# Patient Record
Sex: MALE | Race: WHITE | Employment: FULL TIME | URBAN - METROPOLITAN AREA
[De-identification: names, ages, dates, MRNs, and addresses within clinical notes are randomized per-mention and may not be internally consistent; named-entity substitution may affect disease eponyms.]

---

## 2022-01-28 ENCOUNTER — TRANSCRIBE ORDER (OUTPATIENT)
Dept: SCHEDULING | Age: 52
End: 2022-01-28

## 2022-01-28 DIAGNOSIS — M19.012 ARTHRITIS OF LEFT SHOULDER REGION: Primary | ICD-10-CM

## 2022-02-09 ENCOUNTER — HOSPITAL ENCOUNTER (OUTPATIENT)
Dept: CT IMAGING | Age: 52
Discharge: HOME | End: 2022-02-09
Attending: ORTHOPAEDIC SURGERY
Payer: OTHER GOVERNMENT

## 2022-02-09 DIAGNOSIS — M19.012 ARTHRITIS OF LEFT SHOULDER REGION: ICD-10-CM

## 2022-02-09 PROCEDURE — 73200 CT UPPER EXTREMITY W/O DYE: CPT

## 2022-02-24 NOTE — PERIOP NOTES
PER PAT SCHEDULING NOTES:    PAT: 3-3 @ 9:30/PT TO GET PCR COVID TEST AT [facility] IN [facility] ON 3-7/RESULTS TO BE FAXED, GAVE FAX NUMBER TO PT/ INSTRUCTED PT TO TAKE COPY OF RESULTS TO HOSP DAY OF SX

## 2022-03-03 ENCOUNTER — HOSPITAL ENCOUNTER (OUTPATIENT)
Dept: PREADMISSION TESTING | Age: 52
Discharge: HOME | End: 2022-03-03
Payer: OTHER GOVERNMENT

## 2022-03-03 VITALS
HEART RATE: 60 BPM | SYSTOLIC BLOOD PRESSURE: 130 MMHG | TEMPERATURE: 98.3 F | HEIGHT: 73 IN | BODY MASS INDEX: 30.62 KG/M2 | DIASTOLIC BLOOD PRESSURE: 84 MMHG | WEIGHT: 231.04 LBS

## 2022-03-03 LAB
ABO + RH BLD: NORMAL
APPEARANCE UR: CLEAR
BACTERIA URNS QL MICRO: NEGATIVE /HPF
BILIRUB UR QL: NEGATIVE
BLOOD GROUP ANTIBODIES SERPL: NORMAL
COLOR UR: NORMAL
EPITH CASTS URNS QL MICRO: NORMAL /LPF
EST. AVERAGE GLUCOSE BLD GHB EST-MCNC: 105 MG/DL
GLUCOSE UR STRIP.AUTO-MCNC: NEGATIVE MG/DL
HBA1C MFR BLD: 5.3 % (ref 4–5.6)
HGB UR QL STRIP: NEGATIVE
HYALINE CASTS URNS QL MICRO: NORMAL /LPF (ref 0–5)
INR PPP: 0.9 (ref 0.9–1.1)
KETONES UR QL STRIP.AUTO: NEGATIVE MG/DL
LEUKOCYTE ESTERASE UR QL STRIP.AUTO: NEGATIVE
NITRITE UR QL STRIP.AUTO: NEGATIVE
PH UR STRIP: 7 [PH] (ref 5–8)
PROT UR STRIP-MCNC: NEGATIVE MG/DL
PROTHROMBIN TIME: 9.9 SEC (ref 9–11.1)
RBC #/AREA URNS HPF: NORMAL /HPF (ref 0–5)
SP GR UR REFRACTOMETRY: 1.01 (ref 1–1.03)
SPECIMEN EXP DATE BLD: NORMAL
UA: UC IF INDICATED,UAUC: NORMAL
UROBILINOGEN UR QL STRIP.AUTO: 0.2 EU/DL (ref 0.2–1)
WBC URNS QL MICRO: NORMAL /HPF (ref 0–4)

## 2022-03-03 PROCEDURE — 86900 BLOOD TYPING SEROLOGIC ABO: CPT

## 2022-03-03 PROCEDURE — 85610 PROTHROMBIN TIME: CPT

## 2022-03-03 PROCEDURE — 83036 HEMOGLOBIN GLYCOSYLATED A1C: CPT

## 2022-03-03 PROCEDURE — 81001 URINALYSIS AUTO W/SCOPE: CPT

## 2022-03-03 RX ORDER — LEVOTHYROXINE SODIUM 200 UG/1
TABLET ORAL
COMMUNITY

## 2022-03-03 RX ORDER — INDOMETHACIN 50 MG/1
75 CAPSULE ORAL 3 TIMES DAILY
COMMUNITY

## 2022-03-03 RX ORDER — COLCHICINE 0.6 MG/1
0.6 TABLET ORAL 3 TIMES DAILY
COMMUNITY

## 2022-03-03 RX ORDER — BISMUTH SUBSALICYLATE 262 MG
1 TABLET,CHEWABLE ORAL DAILY
COMMUNITY

## 2022-03-03 RX ORDER — CHOLECALCIFEROL (VITAMIN D3) 50 MCG
CAPSULE ORAL DAILY
COMMUNITY

## 2022-03-03 RX ORDER — CHOLECALCIFEROL TAB 125 MCG (5000 UNIT) 125 MCG
TAB ORAL DAILY
COMMUNITY

## 2022-03-03 RX ORDER — ATORVASTATIN CALCIUM 20 MG/1
TABLET, FILM COATED ORAL
COMMUNITY

## 2022-03-03 RX ORDER — LOSARTAN POTASSIUM 25 MG/1
TABLET ORAL
COMMUNITY

## 2022-03-03 RX ORDER — PROPRANOLOL HYDROCHLORIDE 60 MG/1
CAPSULE, EXTENDED RELEASE ORAL
COMMUNITY

## 2022-03-03 NOTE — PERIOP NOTES
[address] INSTRUCTIONS  ORTHOPAEDIC    Surgery Date:   3/10/22    [facility] staff will call you between 4 PM- 8 PM the day before surgery with your arrival time. If your surgery is on a Monday, we will call you the preceding Friday. Please call [phone] after 8 PM if you did not receive your arrival time. 1. Please report to [facility] Patient Access/Admitting on the 1st floor. Bring your insurance card, photo identification, and any copayment (if applicable). 2. If you are going home the same day of your surgery, you must have a responsible adult to drive you home. You need to have a responsible adult to stay with you the first 24 hours after surgery and you should not drive a car for 24 hours following your surgery. 3. Do NOT eat any solid foods after midnight the night before surgery including candy, mints or gum. You may drink clear liquids from midnight until 1 hour prior to arrival time. You may drink up to 12 ounces at one time every 4 hours. 4. Do NOT drink alcohol or smoke 24 hours before surgery. STOP smoking for 14 days prior as it helps with breathing and healing after surgery. 5. If your arrival time is 3pm or later, you may eat a light breakfast before 8am (toast, bagel-no butter, black coffee, plain tea, fruit juice-no pulp) Please note special instructions, if applicable, below for medications. 6. If you are being admitted to the hospital,please leave personal belongings/luggage in your car until you have an assigned hospital room number. 7. Please wear comfortable clothes. Wear your glasses instead of contacts. We ask that all money, jewelry and valuables be left at home. Wear no make up, particularly mascara, the day of surgery. 8.  All body piercings, rings, and jewelry need to be removed and left at home. Please remove any nail polish or artificial nails from your fingernails. Please wear your hair loose or down.  Please no pony-tails, buns, or any metal hair accessories. If you shower the morning of surgery, please do not apply any lotions or powders afterwards. You may wear deodorant. Do not shave any body area within 24 hours of your surgery. 9. Please follow all instructions to avoid any potential surgical cancellation. 10. Should your physical condition change, (i.e. fever, cold, flu, etc.) please notify your surgeon as soon as possible. 11. It is important to be on time. If a situation occurs where you may be delayed, please call:  [phone] / [phone] on the day of surgery. 12. The Preadmission Testing staff can be reached at [phone]. 13. Special instructions: NONE    Current Outpatient Medications   Medication Sig    levothyroxine (SYNTHROID) 200 mcg tablet Take  by mouth Daily (before breakfast).  atorvastatin (LIPITOR) 20 mg tablet Take  by mouth nightly.  propranolol LA (INDERAL LA) 60 mg SR capsule Take  by mouth nightly.  losartan (COZAAR) 25 mg tablet Take  by mouth nightly.  colchicine 0.6 mg tablet Take 0.6 mg by mouth three (3) times daily.  indomethacin (INDOCIN) 50 mg capsule Take 75 mg by mouth three (3) times daily.  multivitamin (ONE A DAY) tablet Take 1 Tablet by mouth daily.  omega 3-dha-epa-fish oil (Fish OiL) 100-160-1,000 mg cap Take  by mouth daily.  cholecalciferol (VITAMIN D3) (5000 Units/125 mcg) tab tablet Take  by mouth daily. No current facility-administered medications for this encounter. 1. YOU MUST ONLY TAKE THESE MEDICATIONS THE MORNING OF SURGERY WITH A SIP OF WATER: LEVOTHYROXINE, COLCHICINE  2. MEDICATIONS TO TAKE THE MORNING OF SURGERY ONLY IF NEEDED: NONE  3. HOLD these prescription medications BEFORE Surgery: NONE  4. Ask your surgeon/prescribing physician about when/if to STOP taking these medications: NONE  5. Stop any non-steroidal anti-inflammatory drugs (i.e. Ibuprofen, Naproxen, Advil, Aleve) 3 days before surgery. You may take Tylenol.   STOP all vitamins and herbal supplements 1 week prior to  Surgery. 6. STOP INDOMETHACIN ON 3/7/22  7. STOP MULTIVITAMIN, FISH OIL, VITAMIN D ON 3/3/22  8. If you are currently taking Plavix, Coumadin, or any other blood-thinning/anticoagulant medication contact your prescribing physician for instructions. Preventing Infections Before and After  Your Surgery    IMPORTANT INSTRUCTIONS    You play an important role in your health and preparation for surgery. To reduce the germs on your skin you will need to shower with CHG soap (Chorhexidine gluconate 4%) two times before surgery. CHG soap (Hibiclens, Hex-A-Clens or store brand)   CHG soap will be provided at your Preadmission Testing (PAT) appointment.  If you do not have a PAT appointment before surgery, you may arrange to  CHG soap from our office or purchase CHG soap at a pharmacy, grocery or department store.  You need to purchase TWO 4 ounce bottles to use for your 2 showers. Steps to follow:  1. Wash your hair with your normal shampoo and your body with regular soap and rinse well to remove shampoo and soap from your skin. 2. Wet a clean washcloth and turn off the shower. 3. Put CHG soap on washcloth and apply to your entire body from the neck down. Do not use on your head, face or private parts(genitals). Do not use CHG soap on open sores, wounds or areas of skin irritation. 4. Wash you body gently for 5 minutes. Do not wash your skin too hard. This soap does not create lather. Pay special attention to your underarms and from your belly button to your feet. 5. Turn the shower back on and rinse well to get CHG soap off your body. 6. Pat your skin dry with a clean, dry towel. Do not apply lotions or moisturizer. 7. Put on clean clothes and sleep on fresh bed sheets and do not allow pets to sleep with you.     Shower with CHG soap 2 times before your surgery   The evening before your surgery   The morning of your surgery      Tips to help prevent infections after your surgery:  1. Protect your surgical wound from germs:  ? Hand washing is the most important thing you and your caregivers can do to prevent infections. ? Keep your bandage clean and dry! ? Do not touch your surgical wound. 2. Use clean, freshly washed towels and washcloths every time you shower; do not share bath linens with others. 3. Until your surgical wound is healed, wear clothing and sleep on bed linens each day that are clean and freshly washed. 4. Do not allow pets to sleep in your bed with you or touch your surgical wound. 5. Do not smoke  smoking delays wound healing. This may be a good time to stop smoking. 6. If you have diabetes, it is important for you to manage your blood sugar levels properly before your surgery as well as after your surgery. Poorly managed blood sugar levels slow down wound healing and prevent you from healing completely. Prevention of Infection  Testing for Staphylococcus aureus on your skin before surgery    Staphylococcus aureus (staph) is a common bacteria that is found on the body. It normally does not cause infection on healthy skin. Before surgery, you will be tested to see if you have staph by swabbing the inside of your nose. When you have an incision with surgery, the goal is to protect that incision from infection. Removal of the staph bacteria before surgery can decrease the risk of a surgical site infection. If your nose swab is positive for staph you will be called. Your treatment will include 2 steps:   Prescription for Mupirocin ointment to be used in each nostril twice a day for 5 days.  Showering with Chlorhexidine (CHG) liquid soap for 5 days prior to surgery. How to use Mupirocin ointment in your nose  1.  the prescription from your pharmacy. You will receive a large tube of ointment which will be big enough for all of your treatments. You will apply this ointment to each nostril 2 times a day for 5 days.   2. Wash your hands with  gel or soap and water for 20 seconds before using ointment. 3. Place a pea-sized amount of ointment on a cotton Q-tip. 4. Apply ointment just inside of each nostril with the Q-tip. Do not push Q-tip or ointment deep inside you nose. 5. Press your nostrils together and massage for a few seconds. 6. Wash your hands with  gel or soap and water after you are finished. 7. Do not get ointment near your eyes. If it gets into your eyes, rinse them with cool water. 8. If you need to use nasal spray, clean the tip of the bottle with alcohol before use and do not use both at the same time. 9. If you are scheduled for COVID testing during the 5 days, do NOT apply morning dose until after the COVID test has been performed. How to use Chlorhexidine (CHG) 4% liquid soap  1. Purchase an 8 ounce bottle of CHG liquid soap (Chlorhexidine 4%, Hibiclens, Hex-A-Clens or store brand) at a pharmacy or grocery store. 2. Wash your hair with your normal shampoo and your body with regular soap and rinse well to remove shampoo and soap from your skin. 3. Wet a clean washcloth and turn off the shower. 4. Put CHG soap on washcloth and apply to your entire body from the neck down. Do not use on your head, face or private parts(genitals). Do not use CHG soap on open sores, wounds or areas of skin irritation. 5. Wash your body gently for 5 minutes. Do not wash your skin too hard. This soap does not create lather. Pay special attention to your underarms and from your belly button to your feet. 6. Turn the shower back on and rinse well to get CHG soap off your body. 7. Pat your skin dry with a clean, dry towel. Do not apply lotions or moisturizer. 8. Put on clean clothes and sleep on fresh bed sheets the night before surgery. Do not allow pets to sleep with you. Eating and Drinking Before Surgery     You may eat a regular dinner at the usual time on the day before your surgery.    Do NOT eat any solid foods after midnight unless your arrival time at the hospital is 3pm or later.  You may drink clear liquids only from 12 midnight until 1 hours prior to your arrival time at the hospital on the day of your surgery. Do NOT drink alcohol.  Clear liquids include:  o Water  o Fruit juices without pulp( i.e. apple juice)  o Carbonated beverages  o Black coffee (no cream/milk)  o Tea (no cream/milk)  o Gatorade   You may drink up to 12-16 ounces at one time every 4 hours between the hours of midnight and 1 hour before your arrival time at the hospital. Example- if your arrival time at the hospital is 6am, you may drink 12-16 ounces of clear liquids no later than 5am.   If your arrival time at the hospital is 3pm or later, you may eat a light breakfast before 8am.   A light breakfast includes:  o Toast or bagel (no butter)  o Black coffee (no cream/milk)  o Tea (no cream/milk)  o Fruit juices without pulp ( i.e. apple juice)  o Do NOT eat meat, eggs, vegetables or fruit   If you have any questions, please contact your surgeon's office. [facility]   Instructions for Pre-Surgery COVID-19 Testing     Across our ministry we have established standard guidelines to ensure the health and safety of our patients, residents and associates as we resume elective services for patients. All patients presenting for surgery are required to have a COVID-19 test result within 96 hours of their scheduled surgery. [facility] is providing this test free of charge to the patient.    Instructions for COVID-19 Testing:     Patients will receive a call from Pre-Admission Testing 4-5 days prior to surgery to schedule a date and time to come to the [address] [place] [address] Drive for their COVID-19 test   Patients are advised to self-quarantine after testing until their scheduled surgery   Once on site, patients will be registered and receive COVID test in their vehicle   If a patient is scheduled for normal Pre Admission Testing 96 hours from date of surgery, the patient will still have their COVID test done at the [address] [place] [address] located at [address] [place] [address] Positive results will be shared with the surgeon and anesthesiologist and may result in cancellation of the elective procedure    Testing Hours and Location:   Address:  [address] Admission [address] [facility] in the Discharge Lot on [facility] (Map Attached)  [address], [address]   Hours: Monday- Friday 7a-3p    PAT Phone Number: [phone]            Patient Information Regarding COVID Restrictions    Patients are advised to self-quarantine after COVID testing up to the day of the scheduled procedure. Day of Procedure     Please park in the parking deck or any designated visitor parking lot.  Enter the facility through the Main Entrance of the hospital.   A temperature check and appropriate symptom/exposure screening will be done prior to entry to the facility.  On the day of surgery, please provide the cell phone number of the person who will be waiting for you to the Patient Access representative at the time of registration.  Please wear a mask on the day of your procedure.  We are now allowing one designated visitor per stay. Pediatric patients may have 2 designated visitors. This one person may come in with you on the day of your procedure.  No visitors under the age of 13.  The designated visitor must also wear a mask.  Once your procedure and the immediate recovery period is completed, a nurse in the recovery area will contact your designated visitor to inform them of your room number or to otherwise review other pertinent information regarding your care.  Social distancing practices are to be adhered to in waiting areas and the cafeteria. The patient was contacted in person.    He verbalized understanding of all instructions does not  need reinforcement.

## 2022-03-04 LAB
BACTERIA SPEC CULT: NORMAL
BACTERIA SPEC CULT: NORMAL
SERVICE CMNT-IMP: NORMAL

## 2022-03-04 NOTE — PERIOP NOTES
PAT Nurse Practitioner   Pre-Operative Chart Review/Assessment:-ORTHOPEDIC                Patient Name:  Héctor Rios                                                           Age:   45 y.o.    :  1970     Today's Date:  3/7/2022     Date of PAT:   3/3/2022      Date of Surgery:    3/10/2022      Procedure(s):  Left Total Shoulder Arthroplasty and Open Biceps Tenodesis     Surgeon:   Dr. Nena Ley:      1)  Medical Clearance:  Dr. Bunny Pascal      2)  Cardiac Clearance:  EKG and METs reviewed. No further cardiac evaluation requested. EKG from PCP on 3/1/22 on chart. 3)  Diabetic Treatment Consult:  Not indicated. A1c-5.3      4)  Sleep Apnea evaluation:   +SHRADDHA dx. Pt no longer uses mouth guard. 5) Treatment for MRSA/Staph Aureus:  Neg      6) Additional Concerns:  HTN                Vital Signs:         Vitals:    22 0954   BP: 130/84   Pulse: 60   Temp: 98.3 °F (36.8 °C)   Weight: 104.8 kg (231 lb 0.7 oz)   Height: 6' 1\" (1.854 m)            ____________________________________________  PAST MEDICAL HISTORY  Past Medical History:   Diagnosis Date    Arthritis     Gout attack     Hypertension     Sleep apnea     MOUTH GUARD USED, NOT NOW.  Thyroid disease       ____________________________________________  PAST SURGICAL HISTORY  Past Surgical History:   Procedure Laterality Date    HX COLONOSCOPY      HX HERNIA REPAIR Right 1970    inguinal    HX KNEE ARTHROSCOPY Right 1982    X2    HX ORTHOPAEDIC Right     ACHILLES    HX ORTHOPAEDIC Right 2019    ELBOW    HX SHOULDER REPLACEMENT Right 2020    HX WISDOM TEETH EXTRACTION        ____________________________________________  HOME MEDICATIONS  Current Outpatient Medications   Medication Sig    levothyroxine (SYNTHROID) 200 mcg tablet Take  by mouth Daily (before breakfast).  atorvastatin (LIPITOR) 20 mg tablet Take  by mouth nightly.     propranolol LA (INDERAL LA) 60 mg SR capsule Take by mouth nightly.  losartan (COZAAR) 25 mg tablet Take  by mouth nightly.  colchicine 0.6 mg tablet Take 0.6 mg by mouth three (3) times daily.  indomethacin (INDOCIN) 50 mg capsule Take 75 mg by mouth three (3) times daily.  multivitamin (ONE A DAY) tablet Take 1 Tablet by mouth daily.  omega 3-dha-epa-fish oil (Fish OiL) 100-160-1,000 mg cap Take  by mouth daily.  cholecalciferol (VITAMIN D3) (5000 Units/125 mcg) tab tablet Take  by mouth daily. No current facility-administered medications for this encounter.      ____________________________________________  ALLERGIES  Not on File   ____________________________________________  SOCIAL HISTORY  Social History     Tobacco Use    Smoking status: Never Smoker    Smokeless tobacco: Former User   Substance Use Topics    Alcohol use: Yes     Comment: [facility]      ____________________________________________   Internal Administration   First Dose COVID-19, Pfizer Purple top, DILUTE for use, 12+ yrs, 30mcg/0.3mL dose  03/10/2021   Second Dose COVID-19, Pfizer Purple top, DILUTE for use, 12+ yrs, 30mcg/0.3mL dose  03/31/2021      Last COVID Lab Pt to have test done locally and bring DOS. Labs:     Hospital Outpatient Visit on 03/03/2022   Component Date Value Ref Range Status    Crossmatch Expiration 03/03/2022 03/13/2022,2359   Final    ABO/Rh(D) 03/03/2022 B POSITIVE   Final    Antibody screen 03/03/2022 NEG   Final    INR 03/03/2022 0.9  0.9 - 1.1   Final    A single therapeutic range for Vit K antagonists may not be optimal for all indications - see June, 2008 issue of Chest, American College of Chest Physicians Evidence-Based Clinical Practice Guidelines, 8th Edition.     Prothrombin time 03/03/2022 9.9  9.0 - 11.1 sec Final    Color 03/03/2022 YELLOW/STRAW    Final    Color Reference Range: Straw, Yellow or Dark Yellow    Appearance 03/03/2022 CLEAR  CLEAR   Final    Specific gravity 03/03/2022 1.008  1.003 - 1.030   Final    pH (UA) 03/03/2022 7.0  5.0 - 8.0   Final    Protein 03/03/2022 Negative  NEG mg/dL Final    Glucose 03/03/2022 Negative  NEG mg/dL Final    Ketone 03/03/2022 Negative  NEG mg/dL Final    Bilirubin 03/03/2022 Negative  NEG   Final    Blood 03/03/2022 Negative  NEG   Final    Urobilinogen 03/03/2022 0.2  0.2 - 1.0 EU/dL Final    Nitrites 03/03/2022 Negative  NEG   Final    Leukocyte Esterase 03/03/2022 Negative  NEG   Final    UA:UC IF INDICATED 03/03/2022 CULTURE NOT INDICATED BY UA RESULT  CNI   Final    WBC 03/03/2022 0-4  0 - 4 /hpf Final    RBC 03/03/2022 0-5  0 - 5 /hpf Final    Epithelial cells 03/03/2022 FEW  FEW /lpf Final    Epithelial cell category consists of squamous cells and /or transitional urothelial cells. Renal tubular cells, if present, are separately identified as such.  Bacteria 03/03/2022 Negative  NEG /hpf Final    Hyaline cast 03/03/2022 0-2  0 - 5 /lpf Final    Hemoglobin A1c 03/03/2022 5.3  4.0 - 5.6 % Final    Comment: NEW METHOD  PLEASE NOTE NEW REFERENCE RANGE  (NOTE)  HbA1C Interpretive Ranges  <5.7              Normal  5.7 - 6.4         Consider Prediabetes  >6.5              Consider Diabetes      Est. average glucose 03/03/2022 105  mg/dL Final    Special Requests: 03/03/2022 NO SPECIAL REQUESTS    Final    Culture result: 03/03/2022 MRSA NOT PRESENT    Final       Skin:     Denies open wounds, cuts, sores, rashes or other areas of concern in PAT assessment.           Fredi Valdes NP

## 2022-03-10 ENCOUNTER — HOSPITAL ENCOUNTER (OUTPATIENT)
Age: 52
Setting detail: OBSERVATION
Discharge: HOME | End: 2022-03-11
Attending: ORTHOPAEDIC SURGERY | Admitting: ORTHOPAEDIC SURGERY
Payer: OTHER GOVERNMENT

## 2022-03-10 ENCOUNTER — ANESTHESIA EVENT (OUTPATIENT)
Dept: SURGERY | Age: 52
End: 2022-03-10
Payer: OTHER GOVERNMENT

## 2022-03-10 ENCOUNTER — APPOINTMENT (OUTPATIENT)
Dept: GENERAL RADIOLOGY | Age: 52
End: 2022-03-10
Attending: ORTHOPAEDIC SURGERY
Payer: OTHER GOVERNMENT

## 2022-03-10 ENCOUNTER — ANESTHESIA (OUTPATIENT)
Dept: SURGERY | Age: 52
End: 2022-03-10
Payer: OTHER GOVERNMENT

## 2022-03-10 DIAGNOSIS — M19.012 PRIMARY OSTEOARTHRITIS OF LEFT SHOULDER: Primary | ICD-10-CM

## 2022-03-10 DIAGNOSIS — Z96.612 STATUS POST TOTAL SHOULDER ARTHROPLASTY, LEFT: ICD-10-CM

## 2022-03-10 LAB
GLUCOSE BLD STRIP.AUTO-MCNC: 106 MG/DL (ref 65–117)
SERVICE CMNT-IMP: NORMAL

## 2022-03-10 PROCEDURE — 77030011264 HC ELECTRD BLD EXT COVD -A: Performed by: ORTHOPAEDIC SURGERY

## 2022-03-10 PROCEDURE — 74011250636 HC RX REV CODE- 250/636: Performed by: ORTHOPAEDIC SURGERY

## 2022-03-10 PROCEDURE — 74011250637 HC RX REV CODE- 250/637: Performed by: ORTHOPAEDIC SURGERY

## 2022-03-10 PROCEDURE — G0378 HOSPITAL OBSERVATION PER HR: HCPCS

## 2022-03-10 PROCEDURE — 77030010507 HC ADH SKN DERMBND J&J -B: Performed by: ORTHOPAEDIC SURGERY

## 2022-03-10 PROCEDURE — 74011250636 HC RX REV CODE- 250/636: Performed by: ANESTHESIOLOGY

## 2022-03-10 PROCEDURE — 74011000250 HC RX REV CODE- 250: Performed by: ORTHOPAEDIC SURGERY

## 2022-03-10 PROCEDURE — 2709999900 HC NON-CHARGEABLE SUPPLY: Performed by: ORTHOPAEDIC SURGERY

## 2022-03-10 PROCEDURE — 77030006822 HC BLD SAW SAG BRSM -B: Performed by: ORTHOPAEDIC SURGERY

## 2022-03-10 PROCEDURE — 77030002922 HC SUT FBRWRE ARTH -B: Performed by: ORTHOPAEDIC SURGERY

## 2022-03-10 PROCEDURE — 77030002933 HC SUT MCRYL J&J -A: Performed by: ORTHOPAEDIC SURGERY

## 2022-03-10 PROCEDURE — 77030026438 HC STYL ET INTUB CARD -A: Performed by: ANESTHESIOLOGY

## 2022-03-10 PROCEDURE — 74011250636 HC RX REV CODE- 250/636: Performed by: NURSE ANESTHETIST, CERTIFIED REGISTERED

## 2022-03-10 PROCEDURE — 77030040361 HC SLV COMPR DVT MDII -B: Performed by: ORTHOPAEDIC SURGERY

## 2022-03-10 PROCEDURE — 77030018791 HC NDL SUT CARM -A: Performed by: ORTHOPAEDIC SURGERY

## 2022-03-10 PROCEDURE — 74011250637 HC RX REV CODE- 250/637

## 2022-03-10 PROCEDURE — 77030040922 HC BLNKT HYPOTHRM STRY -A

## 2022-03-10 PROCEDURE — 74011250637 HC RX REV CODE- 250/637: Performed by: ANESTHESIOLOGY

## 2022-03-10 PROCEDURE — 77030019908 HC STETH ESOPH SIMS -A: Performed by: ANESTHESIOLOGY

## 2022-03-10 PROCEDURE — 77030018547 HC SUT ETHBND1 J&J -B: Performed by: ORTHOPAEDIC SURGERY

## 2022-03-10 PROCEDURE — 76210000016 HC OR PH I REC 1 TO 1.5 HR: Performed by: ORTHOPAEDIC SURGERY

## 2022-03-10 PROCEDURE — 74011000258 HC RX REV CODE- 258: Performed by: NURSE ANESTHETIST, CERTIFIED REGISTERED

## 2022-03-10 PROCEDURE — 77030008684 HC TU ET CUF COVD -B: Performed by: ANESTHESIOLOGY

## 2022-03-10 PROCEDURE — 77030013079 HC BLNKT BAIR HGGR 3M -A: Performed by: ANESTHESIOLOGY

## 2022-03-10 PROCEDURE — C1713 ANCHOR/SCREW BN/BN,TIS/BN: HCPCS | Performed by: ORTHOPAEDIC SURGERY

## 2022-03-10 PROCEDURE — 77030020274 HC MISC IMPL ORTHOPEDIC: Performed by: ORTHOPAEDIC SURGERY

## 2022-03-10 PROCEDURE — 76010000173 HC OR TIME 3 TO 3.5 HR INTENSV-TIER 1: Performed by: ORTHOPAEDIC SURGERY

## 2022-03-10 PROCEDURE — 77030020275 HC MISC ORTHOPEDIC: Performed by: ORTHOPAEDIC SURGERY

## 2022-03-10 PROCEDURE — 74011000250 HC RX REV CODE- 250: Performed by: NURSE ANESTHETIST, CERTIFIED REGISTERED

## 2022-03-10 PROCEDURE — 77030031139 HC SUT VCRL2 J&J -A: Performed by: ORTHOPAEDIC SURGERY

## 2022-03-10 PROCEDURE — 73020 X-RAY EXAM OF SHOULDER: CPT

## 2022-03-10 PROCEDURE — 82962 GLUCOSE BLOOD TEST: CPT

## 2022-03-10 PROCEDURE — C1776 JOINT DEVICE (IMPLANTABLE): HCPCS | Performed by: ORTHOPAEDIC SURGERY

## 2022-03-10 PROCEDURE — 77030018074 HC RTVR SUT ARTH4 S&N -B: Performed by: ORTHOPAEDIC SURGERY

## 2022-03-10 PROCEDURE — 77030038662 HC GD PIN TRIL -C: Performed by: ORTHOPAEDIC SURGERY

## 2022-03-10 PROCEDURE — 77030003890 HC BIT DRL TWST MCRA -A: Performed by: ORTHOPAEDIC SURGERY

## 2022-03-10 PROCEDURE — 76060000038 HC ANESTHESIA 3.5 TO 4 HR: Performed by: ORTHOPAEDIC SURGERY

## 2022-03-10 DEVICE — IMPLANTABLE DEVICE
Type: IMPLANTABLE DEVICE | Site: SHOULDER | Status: FUNCTIONAL
Brand: SIMPLICITI™

## 2022-03-10 DEVICE — NUCLEUS
Type: IMPLANTABLE DEVICE | Site: SHOULDER | Status: FUNCTIONAL
Brand: TORNIER SIMPLICITI SHOULDER SYSTEM

## 2022-03-10 DEVICE — SMARTSET HIGH PERFORMANCE MV MEDIUM VISCOSITY BONE CEMENT 40G
Type: IMPLANTABLE DEVICE | Site: SHOULDER | Status: FUNCTIONAL
Brand: SMARTSET

## 2022-03-10 DEVICE — UPCHARGE BLUEPRINT GUIDE GLEN: Type: IMPLANTABLE DEVICE | Status: FUNCTIONAL

## 2022-03-10 DEVICE — IMPLANTABLE DEVICE: Type: IMPLANTABLE DEVICE | Site: SHOULDER | Status: FUNCTIONAL

## 2022-03-10 DEVICE — SHOULDER S2 TOT ADV ANAT -- IMPL CAPPED S2: Type: IMPLANTABLE DEVICE | Status: FUNCTIONAL

## 2022-03-10 RX ORDER — MIDAZOLAM HYDROCHLORIDE 1 MG/ML
0.5 INJECTION, SOLUTION INTRAMUSCULAR; INTRAVENOUS
Status: DISCONTINUED | OUTPATIENT
Start: 2022-03-10 | End: 2022-03-10 | Stop reason: HOSPADM

## 2022-03-10 RX ORDER — OXYCODONE HYDROCHLORIDE 5 MG/1
5 TABLET ORAL
Status: DISCONTINUED | OUTPATIENT
Start: 2022-03-10 | End: 2022-03-11 | Stop reason: HOSPADM

## 2022-03-10 RX ORDER — MIDAZOLAM HYDROCHLORIDE 1 MG/ML
1 INJECTION, SOLUTION INTRAMUSCULAR; INTRAVENOUS AS NEEDED
Status: DISCONTINUED | OUTPATIENT
Start: 2022-03-10 | End: 2022-03-10 | Stop reason: HOSPADM

## 2022-03-10 RX ORDER — DIPHENHYDRAMINE HYDROCHLORIDE 50 MG/ML
12.5 INJECTION, SOLUTION INTRAMUSCULAR; INTRAVENOUS AS NEEDED
Status: DISCONTINUED | OUTPATIENT
Start: 2022-03-10 | End: 2022-03-10 | Stop reason: HOSPADM

## 2022-03-10 RX ORDER — LIDOCAINE HYDROCHLORIDE 10 MG/ML
0.1 INJECTION, SOLUTION EPIDURAL; INFILTRATION; INTRACAUDAL; PERINEURAL AS NEEDED
Status: DISCONTINUED | OUTPATIENT
Start: 2022-03-10 | End: 2022-03-10 | Stop reason: HOSPADM

## 2022-03-10 RX ORDER — ONDANSETRON 2 MG/ML
4 INJECTION INTRAMUSCULAR; INTRAVENOUS
Status: ACTIVE | OUTPATIENT
Start: 2022-03-10 | End: 2022-03-11

## 2022-03-10 RX ORDER — METHOCARBAMOL 500 MG/1
500 TABLET, FILM COATED ORAL
Status: DISCONTINUED | OUTPATIENT
Start: 2022-03-10 | End: 2022-03-11 | Stop reason: HOSPADM

## 2022-03-10 RX ORDER — PROPOFOL 10 MG/ML
INJECTION, EMULSION INTRAVENOUS AS NEEDED
Status: DISCONTINUED | OUTPATIENT
Start: 2022-03-10 | End: 2022-03-10 | Stop reason: HOSPADM

## 2022-03-10 RX ORDER — FAMOTIDINE 20 MG/1
20 TABLET, FILM COATED ORAL 2 TIMES DAILY
Status: DISCONTINUED | OUTPATIENT
Start: 2022-03-10 | End: 2022-03-11 | Stop reason: HOSPADM

## 2022-03-10 RX ORDER — ONDANSETRON 2 MG/ML
INJECTION INTRAMUSCULAR; INTRAVENOUS AS NEEDED
Status: DISCONTINUED | OUTPATIENT
Start: 2022-03-10 | End: 2022-03-10 | Stop reason: HOSPADM

## 2022-03-10 RX ORDER — HYDROMORPHONE HYDROCHLORIDE 1 MG/ML
1 INJECTION, SOLUTION INTRAMUSCULAR; INTRAVENOUS; SUBCUTANEOUS
Status: ACTIVE | OUTPATIENT
Start: 2022-03-10 | End: 2022-03-11

## 2022-03-10 RX ORDER — DEXAMETHASONE SODIUM PHOSPHATE 4 MG/ML
INJECTION, SOLUTION INTRA-ARTICULAR; INTRALESIONAL; INTRAMUSCULAR; INTRAVENOUS; SOFT TISSUE AS NEEDED
Status: DISCONTINUED | OUTPATIENT
Start: 2022-03-10 | End: 2022-03-10 | Stop reason: HOSPADM

## 2022-03-10 RX ORDER — FENTANYL CITRATE 50 UG/ML
50 INJECTION, SOLUTION INTRAMUSCULAR; INTRAVENOUS AS NEEDED
Status: DISCONTINUED | OUTPATIENT
Start: 2022-03-10 | End: 2022-03-10 | Stop reason: HOSPADM

## 2022-03-10 RX ORDER — SODIUM CHLORIDE 9 MG/ML
25 INJECTION, SOLUTION INTRAVENOUS CONTINUOUS
Status: DISCONTINUED | OUTPATIENT
Start: 2022-03-10 | End: 2022-03-10 | Stop reason: HOSPADM

## 2022-03-10 RX ORDER — ACETAMINOPHEN 325 MG/1
650 TABLET ORAL ONCE
Status: COMPLETED | OUTPATIENT
Start: 2022-03-10 | End: 2022-03-10

## 2022-03-10 RX ORDER — LIDOCAINE HYDROCHLORIDE 20 MG/ML
INJECTION, SOLUTION EPIDURAL; INFILTRATION; INTRACAUDAL; PERINEURAL AS NEEDED
Status: DISCONTINUED | OUTPATIENT
Start: 2022-03-10 | End: 2022-03-10 | Stop reason: HOSPADM

## 2022-03-10 RX ORDER — COLCHICINE 0.6 MG/1
0.6 TABLET ORAL DAILY
Status: DISCONTINUED | OUTPATIENT
Start: 2022-03-11 | End: 2022-03-11 | Stop reason: HOSPADM

## 2022-03-10 RX ORDER — SODIUM CHLORIDE, SODIUM LACTATE, POTASSIUM CHLORIDE, CALCIUM CHLORIDE 600; 310; 30; 20 MG/100ML; MG/100ML; MG/100ML; MG/100ML
1000 INJECTION, SOLUTION INTRAVENOUS CONTINUOUS
Status: DISCONTINUED | OUTPATIENT
Start: 2022-03-10 | End: 2022-03-10 | Stop reason: HOSPADM

## 2022-03-10 RX ORDER — SODIUM CHLORIDE 0.9 % (FLUSH) 0.9 %
5-40 SYRINGE (ML) INJECTION AS NEEDED
Status: DISCONTINUED | OUTPATIENT
Start: 2022-03-10 | End: 2022-03-11 | Stop reason: HOSPADM

## 2022-03-10 RX ORDER — SODIUM CHLORIDE, SODIUM LACTATE, POTASSIUM CHLORIDE, CALCIUM CHLORIDE 600; 310; 30; 20 MG/100ML; MG/100ML; MG/100ML; MG/100ML
100 INJECTION, SOLUTION INTRAVENOUS CONTINUOUS
Status: DISCONTINUED | OUTPATIENT
Start: 2022-03-10 | End: 2022-03-10 | Stop reason: HOSPADM

## 2022-03-10 RX ORDER — ONDANSETRON 2 MG/ML
4 INJECTION INTRAMUSCULAR; INTRAVENOUS AS NEEDED
Status: DISCONTINUED | OUTPATIENT
Start: 2022-03-10 | End: 2022-03-10 | Stop reason: HOSPADM

## 2022-03-10 RX ORDER — AMOXICILLIN 250 MG
1 CAPSULE ORAL 2 TIMES DAILY
Status: DISCONTINUED | OUTPATIENT
Start: 2022-03-10 | End: 2022-03-11 | Stop reason: HOSPADM

## 2022-03-10 RX ORDER — ACETAMINOPHEN 325 MG/1
650 TABLET ORAL
Status: DISCONTINUED | OUTPATIENT
Start: 2022-03-10 | End: 2022-03-10

## 2022-03-10 RX ORDER — FACIAL-BODY WIPES
10 EACH TOPICAL DAILY PRN
Status: DISCONTINUED | OUTPATIENT
Start: 2022-03-12 | End: 2022-03-11 | Stop reason: HOSPADM

## 2022-03-10 RX ORDER — PROPRANOLOL HYDROCHLORIDE 60 MG/1
60 CAPSULE, EXTENDED RELEASE ORAL
Status: DISCONTINUED | OUTPATIENT
Start: 2022-03-10 | End: 2022-03-11 | Stop reason: HOSPADM

## 2022-03-10 RX ORDER — HYDROXYZINE HYDROCHLORIDE 10 MG/1
10 TABLET, FILM COATED ORAL
Status: DISCONTINUED | OUTPATIENT
Start: 2022-03-10 | End: 2022-03-11 | Stop reason: HOSPADM

## 2022-03-10 RX ORDER — MORPHINE SULFATE 2 MG/ML
2 INJECTION, SOLUTION INTRAMUSCULAR; INTRAVENOUS
Status: DISCONTINUED | OUTPATIENT
Start: 2022-03-10 | End: 2022-03-10 | Stop reason: HOSPADM

## 2022-03-10 RX ORDER — EPHEDRINE SULFATE/0.9% NACL/PF 50 MG/5 ML
SYRINGE (ML) INTRAVENOUS AS NEEDED
Status: DISCONTINUED | OUTPATIENT
Start: 2022-03-10 | End: 2022-03-10 | Stop reason: HOSPADM

## 2022-03-10 RX ORDER — EPHEDRINE SULFATE/0.9% NACL/PF 50 MG/5 ML
5 SYRINGE (ML) INTRAVENOUS AS NEEDED
Status: DISCONTINUED | OUTPATIENT
Start: 2022-03-10 | End: 2022-03-10 | Stop reason: HOSPADM

## 2022-03-10 RX ORDER — TRANEXAMIC ACID 100 MG/ML
INJECTION, SOLUTION INTRAVENOUS AS NEEDED
Status: DISCONTINUED | OUTPATIENT
Start: 2022-03-10 | End: 2022-03-10 | Stop reason: HOSPADM

## 2022-03-10 RX ORDER — OXYCODONE HYDROCHLORIDE 5 MG/1
5 TABLET ORAL AS NEEDED
Status: DISCONTINUED | OUTPATIENT
Start: 2022-03-10 | End: 2022-03-10 | Stop reason: HOSPADM

## 2022-03-10 RX ORDER — ROCURONIUM BROMIDE 10 MG/ML
INJECTION, SOLUTION INTRAVENOUS AS NEEDED
Status: DISCONTINUED | OUTPATIENT
Start: 2022-03-10 | End: 2022-03-10 | Stop reason: HOSPADM

## 2022-03-10 RX ORDER — SODIUM CHLORIDE 9 MG/ML
25 INJECTION, SOLUTION INTRAVENOUS AS NEEDED
Status: DISCONTINUED | OUTPATIENT
Start: 2022-03-10 | End: 2022-03-11 | Stop reason: HOSPADM

## 2022-03-10 RX ORDER — POLYETHYLENE GLYCOL 3350 17 G/17G
17 POWDER, FOR SOLUTION ORAL DAILY
Status: DISCONTINUED | OUTPATIENT
Start: 2022-03-10 | End: 2022-03-11 | Stop reason: HOSPADM

## 2022-03-10 RX ORDER — KETOROLAC TROMETHAMINE 30 MG/ML
15 INJECTION, SOLUTION INTRAMUSCULAR; INTRAVENOUS EVERY 6 HOURS
Status: COMPLETED | OUTPATIENT
Start: 2022-03-10 | End: 2022-03-11

## 2022-03-10 RX ORDER — ACETAMINOPHEN 325 MG/1
650 TABLET ORAL EVERY 6 HOURS
Status: DISCONTINUED | OUTPATIENT
Start: 2022-03-10 | End: 2022-03-11 | Stop reason: HOSPADM

## 2022-03-10 RX ORDER — FENTANYL CITRATE 50 UG/ML
INJECTION, SOLUTION INTRAMUSCULAR; INTRAVENOUS AS NEEDED
Status: DISCONTINUED | OUTPATIENT
Start: 2022-03-10 | End: 2022-03-10 | Stop reason: HOSPADM

## 2022-03-10 RX ORDER — ATORVASTATIN CALCIUM 20 MG/1
20 TABLET, FILM COATED ORAL
Status: DISCONTINUED | OUTPATIENT
Start: 2022-03-11 | End: 2022-03-11 | Stop reason: HOSPADM

## 2022-03-10 RX ORDER — MIDAZOLAM HYDROCHLORIDE 1 MG/ML
INJECTION, SOLUTION INTRAMUSCULAR; INTRAVENOUS AS NEEDED
Status: DISCONTINUED | OUTPATIENT
Start: 2022-03-10 | End: 2022-03-10 | Stop reason: HOSPADM

## 2022-03-10 RX ORDER — FENTANYL CITRATE 50 UG/ML
25 INJECTION, SOLUTION INTRAMUSCULAR; INTRAVENOUS
Status: DISCONTINUED | OUTPATIENT
Start: 2022-03-10 | End: 2022-03-10 | Stop reason: HOSPADM

## 2022-03-10 RX ORDER — HYDROMORPHONE HYDROCHLORIDE 2 MG/ML
INJECTION, SOLUTION INTRAMUSCULAR; INTRAVENOUS; SUBCUTANEOUS AS NEEDED
Status: DISCONTINUED | OUTPATIENT
Start: 2022-03-10 | End: 2022-03-10 | Stop reason: HOSPADM

## 2022-03-10 RX ORDER — KETAMINE HYDROCHLORIDE 10 MG/ML
INJECTION, SOLUTION INTRAMUSCULAR; INTRAVENOUS AS NEEDED
Status: DISCONTINUED | OUTPATIENT
Start: 2022-03-10 | End: 2022-03-10 | Stop reason: HOSPADM

## 2022-03-10 RX ORDER — SODIUM CHLORIDE, SODIUM LACTATE, POTASSIUM CHLORIDE, CALCIUM CHLORIDE 600; 310; 30; 20 MG/100ML; MG/100ML; MG/100ML; MG/100ML
25 INJECTION, SOLUTION INTRAVENOUS CONTINUOUS
Status: DISCONTINUED | OUTPATIENT
Start: 2022-03-10 | End: 2022-03-10 | Stop reason: HOSPADM

## 2022-03-10 RX ORDER — SODIUM CHLORIDE 0.9 % (FLUSH) 0.9 %
5-40 SYRINGE (ML) INJECTION EVERY 8 HOURS
Status: DISCONTINUED | OUTPATIENT
Start: 2022-03-10 | End: 2022-03-11 | Stop reason: HOSPADM

## 2022-03-10 RX ORDER — HYDROMORPHONE HYDROCHLORIDE 1 MG/ML
0.2 INJECTION, SOLUTION INTRAMUSCULAR; INTRAVENOUS; SUBCUTANEOUS
Status: COMPLETED | OUTPATIENT
Start: 2022-03-10 | End: 2022-03-10

## 2022-03-10 RX ORDER — MELATONIN
5000 DAILY
Status: DISCONTINUED | OUTPATIENT
Start: 2022-03-11 | End: 2022-03-11 | Stop reason: HOSPADM

## 2022-03-10 RX ORDER — OXYCODONE HYDROCHLORIDE 5 MG/1
10 TABLET ORAL
Status: DISCONTINUED | OUTPATIENT
Start: 2022-03-10 | End: 2022-03-11 | Stop reason: HOSPADM

## 2022-03-10 RX ORDER — THERA TABS 400 MCG
1 TAB ORAL DAILY
Status: DISCONTINUED | OUTPATIENT
Start: 2022-03-10 | End: 2022-03-11 | Stop reason: HOSPADM

## 2022-03-10 RX ORDER — LEVOTHYROXINE SODIUM 100 UG/1
200 TABLET ORAL
Status: DISCONTINUED | OUTPATIENT
Start: 2022-03-11 | End: 2022-03-11 | Stop reason: HOSPADM

## 2022-03-10 RX ORDER — LOSARTAN POTASSIUM 25 MG/1
25 TABLET ORAL
Status: DISCONTINUED | OUTPATIENT
Start: 2022-03-11 | End: 2022-03-11 | Stop reason: HOSPADM

## 2022-03-10 RX ORDER — ROPIVACAINE HYDROCHLORIDE 5 MG/ML
150 INJECTION, SOLUTION EPIDURAL; INFILTRATION; PERINEURAL AS NEEDED
Status: DISCONTINUED | OUTPATIENT
Start: 2022-03-10 | End: 2022-03-10 | Stop reason: HOSPADM

## 2022-03-10 RX ORDER — NALOXONE HYDROCHLORIDE 0.4 MG/ML
0.4 INJECTION, SOLUTION INTRAMUSCULAR; INTRAVENOUS; SUBCUTANEOUS AS NEEDED
Status: DISCONTINUED | OUTPATIENT
Start: 2022-03-10 | End: 2022-03-11 | Stop reason: HOSPADM

## 2022-03-10 RX ADMIN — KETOROLAC TROMETHAMINE 15 MG: 30 INJECTION, SOLUTION INTRAMUSCULAR; INTRAVENOUS at 23:48

## 2022-03-10 RX ADMIN — FENTANYL CITRATE 25 MCG: 50 INJECTION, SOLUTION INTRAMUSCULAR; INTRAVENOUS at 11:31

## 2022-03-10 RX ADMIN — FENTANYL CITRATE 100 MCG: 50 INJECTION, SOLUTION INTRAMUSCULAR; INTRAVENOUS at 07:49

## 2022-03-10 RX ADMIN — ACETAMINOPHEN 650 MG: 325 TABLET ORAL at 23:48

## 2022-03-10 RX ADMIN — HYDROMORPHONE HYDROCHLORIDE 0.2 MG: 1 INJECTION, SOLUTION INTRAMUSCULAR; INTRAVENOUS; SUBCUTANEOUS at 12:09

## 2022-03-10 RX ADMIN — KETOROLAC TROMETHAMINE 15 MG: 30 INJECTION, SOLUTION INTRAMUSCULAR; INTRAVENOUS at 17:17

## 2022-03-10 RX ADMIN — FAMOTIDINE 20 MG: 20 TABLET, FILM COATED ORAL at 17:16

## 2022-03-10 RX ADMIN — HYDROMORPHONE HYDROCHLORIDE 0.2 MG: 1 INJECTION, SOLUTION INTRAMUSCULAR; INTRAVENOUS; SUBCUTANEOUS at 12:19

## 2022-03-10 RX ADMIN — POLYETHYLENE GLYCOL 3350 17 G: 17 POWDER, FOR SOLUTION ORAL at 17:16

## 2022-03-10 RX ADMIN — HYDROMORPHONE HYDROCHLORIDE 0.2 MG: 1 INJECTION, SOLUTION INTRAMUSCULAR; INTRAVENOUS; SUBCUTANEOUS at 12:01

## 2022-03-10 RX ADMIN — SODIUM CHLORIDE, PRESERVATIVE FREE 10 ML: 5 INJECTION INTRAVENOUS at 21:32

## 2022-03-10 RX ADMIN — Medication 10 MG: at 07:57

## 2022-03-10 RX ADMIN — ACETAMINOPHEN 650 MG: 325 TABLET ORAL at 17:16

## 2022-03-10 RX ADMIN — DEXAMETHASONE SODIUM PHOSPHATE 4 MG: 4 INJECTION, SOLUTION INTRAMUSCULAR; INTRAVENOUS at 08:00

## 2022-03-10 RX ADMIN — Medication 10 MG: at 09:25

## 2022-03-10 RX ADMIN — ROCURONIUM BROMIDE 20 MG: 10 SOLUTION INTRAVENOUS at 08:15

## 2022-03-10 RX ADMIN — ACETAMINOPHEN 650 MG: 325 TABLET ORAL at 14:12

## 2022-03-10 RX ADMIN — WATER 2 G: 1 INJECTION INTRAMUSCULAR; INTRAVENOUS; SUBCUTANEOUS at 23:51

## 2022-03-10 RX ADMIN — SODIUM CHLORIDE, POTASSIUM CHLORIDE, SODIUM LACTATE AND CALCIUM CHLORIDE 25 ML/HR: 600; 310; 30; 20 INJECTION, SOLUTION INTRAVENOUS at 06:46

## 2022-03-10 RX ADMIN — HYDROMORPHONE HYDROCHLORIDE 0.2 MG: 1 INJECTION, SOLUTION INTRAMUSCULAR; INTRAVENOUS; SUBCUTANEOUS at 12:40

## 2022-03-10 RX ADMIN — ROCURONIUM BROMIDE 10 MG: 10 SOLUTION INTRAVENOUS at 07:49

## 2022-03-10 RX ADMIN — Medication 20 MG: at 07:50

## 2022-03-10 RX ADMIN — ROCURONIUM BROMIDE 25 MG: 10 SOLUTION INTRAVENOUS at 09:20

## 2022-03-10 RX ADMIN — PHENYLEPHRINE HYDROCHLORIDE 40 MCG/MIN: 10 INJECTION INTRAVENOUS at 07:54

## 2022-03-10 RX ADMIN — DEXMEDETOMIDINE HYDROCHLORIDE 8 MCG: 100 INJECTION, SOLUTION, CONCENTRATE INTRAVENOUS at 08:52

## 2022-03-10 RX ADMIN — DEXMEDETOMIDINE HYDROCHLORIDE 12 MCG: 100 INJECTION, SOLUTION, CONCENTRATE INTRAVENOUS at 11:12

## 2022-03-10 RX ADMIN — WATER 2 G: 1 INJECTION INTRAMUSCULAR; INTRAVENOUS; SUBCUTANEOUS at 16:00

## 2022-03-10 RX ADMIN — ACETAMINOPHEN 650 MG: 325 TABLET ORAL at 07:13

## 2022-03-10 RX ADMIN — ONDANSETRON HYDROCHLORIDE 4 MG: 2 INJECTION, SOLUTION INTRAMUSCULAR; INTRAVENOUS at 10:35

## 2022-03-10 RX ADMIN — KETOROLAC TROMETHAMINE 15 MG: 30 INJECTION, SOLUTION INTRAMUSCULAR; INTRAVENOUS at 11:53

## 2022-03-10 RX ADMIN — FENTANYL CITRATE 50 MCG: 50 INJECTION, SOLUTION INTRAMUSCULAR; INTRAVENOUS at 08:37

## 2022-03-10 RX ADMIN — WATER 2 G: 1 INJECTION INTRAMUSCULAR; INTRAVENOUS; SUBCUTANEOUS at 08:00

## 2022-03-10 RX ADMIN — HYDROMORPHONE HYDROCHLORIDE 1 MG: 2 INJECTION, SOLUTION INTRAMUSCULAR; INTRAVENOUS; SUBCUTANEOUS at 08:42

## 2022-03-10 RX ADMIN — MIDAZOLAM 2 MG: 1 INJECTION INTRAMUSCULAR; INTRAVENOUS at 07:35

## 2022-03-10 RX ADMIN — ROCURONIUM BROMIDE 40 MG: 10 SOLUTION INTRAVENOUS at 07:50

## 2022-03-10 RX ADMIN — ROCURONIUM BROMIDE 30 MG: 10 SOLUTION INTRAVENOUS at 09:02

## 2022-03-10 RX ADMIN — PROPOFOL 200 MG: 10 INJECTION, EMULSION INTRAVENOUS at 07:49

## 2022-03-10 RX ADMIN — LIDOCAINE HYDROCHLORIDE 100 MG: 20 INJECTION, SOLUTION EPIDURAL; INFILTRATION; INTRACAUDAL; PERINEURAL at 07:49

## 2022-03-10 RX ADMIN — Medication 10 MG: at 10:14

## 2022-03-10 RX ADMIN — OXYCODONE 5 MG: 5 TABLET ORAL at 13:12

## 2022-03-10 RX ADMIN — PROPRANOLOL HYDROCHLORIDE 60 MG: 60 CAPSULE, EXTENDED RELEASE ORAL at 21:32

## 2022-03-10 RX ADMIN — OXYCODONE 10 MG: 5 TABLET ORAL at 17:17

## 2022-03-10 RX ADMIN — SENNOSIDES AND DOCUSATE SODIUM 1 TABLET: 50; 8.6 TABLET ORAL at 17:17

## 2022-03-10 RX ADMIN — FENTANYL CITRATE 50 MCG: 50 INJECTION, SOLUTION INTRAMUSCULAR; INTRAVENOUS at 07:35

## 2022-03-10 RX ADMIN — FENTANYL CITRATE 25 MCG: 50 INJECTION, SOLUTION INTRAMUSCULAR; INTRAVENOUS at 11:45

## 2022-03-10 RX ADMIN — MIDAZOLAM 1 MG: 1 INJECTION INTRAMUSCULAR; INTRAVENOUS at 07:14

## 2022-03-10 RX ADMIN — Medication 30 MG: at 07:49

## 2022-03-10 RX ADMIN — TRANEXAMIC ACID 1 G: 100 INJECTION, SOLUTION INTRAVENOUS at 10:35

## 2022-03-10 RX ADMIN — FAMOTIDINE 20 MG: 20 TABLET, FILM COATED ORAL at 11:00

## 2022-03-10 RX ADMIN — TRANEXAMIC ACID 1 G: 100 INJECTION, SOLUTION INTRAVENOUS at 08:00

## 2022-03-10 RX ADMIN — THERA TABS 1 TABLET: TAB at 13:12

## 2022-03-10 RX ADMIN — HYDROMORPHONE HYDROCHLORIDE 0.2 MG: 1 INJECTION, SOLUTION INTRAMUSCULAR; INTRAVENOUS; SUBCUTANEOUS at 12:30

## 2022-03-10 RX ADMIN — SODIUM CHLORIDE, PRESERVATIVE FREE 10 ML: 5 INJECTION INTRAVENOUS at 14:00

## 2022-03-10 RX ADMIN — OXYCODONE 5 MG: 5 TABLET ORAL at 21:27

## 2022-03-10 RX ADMIN — SUGAMMADEX 200 MG: 100 INJECTION, SOLUTION INTRAVENOUS at 10:59

## 2022-03-10 RX ADMIN — INSULIN ASPART INJ 100 UNIT/ML: at 09:00

## 2022-03-10 RX ADMIN — FENTANYL CITRATE 50 MCG: 50 INJECTION, SOLUTION INTRAMUSCULAR; INTRAVENOUS at 08:57

## 2022-03-10 NOTE — INTERVAL H&P NOTE
Update History & Physical    The Patient's History and Physical of March 3,   2022 was reviewed with the patient and I examined the patient. There was no change. The surgical site was confirmed by the patient and me. Plan:  The risk, benefits, expected outcome, and alternative to the recommended procedure have been discussed with the patient. Patient understands and wants to proceed with the procedure.     Electronically signed by Irasema Valero MD on 3/10/2022 at 7:21 AM

## 2022-03-10 NOTE — DISCHARGE INSTRUCTIONS
Dr. Mandy Kan Total Shoulder Replacement Postoperative Instructions     Follow-Up Appointment:  o Follow up in the office 2 weeks after surgery. If this appointment is not already scheduled, please call our office at [phone].  Activity:  o The operative extremity is nonweightbearing. You should NOT use this arm for any pushing, pulling, reaching, lifting or assistance getting to or maintaining a standing position.  o You can use the operative-sided hand. You can feed yourself. You should not carry objects more than 2 pounds. o Ambulate every hour. Use the incentive spirometer every half hour when resting.  o A sling will be used for the first 2 weeks when sleeping or ambulating. You can transition out of the sling as you can tolerate.  o Remove the sling three times daily to do range of motion exercises of the elbow and wrist. It is ok to do pendulum exercises to the shoulder. o Do Not externally rotate the shoulder past straight forward or active inward-rotation towards the belly for 6 weeks after surgery. o Application of the ice packs will prevent and treat inflammation and reduce pain and swelling. You should ice the incisional area at least 3 times a day, 20-30 minutes at a time. o Prevent any falls. Clear your living environment of rugs or floor objects that may be tripping hazards.  Dressings / Wound Care:  o Keep dressing in place until the follow-up visit. o Dermabond (skin glue) may be used to help close the incision, which appears as a thin, transparent covering over the incision. Do not pick or pull at this covering, this will fall off naturally within 2-3 weeks. o Do not apply antibiotic ointment, creams or lotions to your incision.  o Once your waterproof dressing has been removed, you may change bandages daily if you like or leave them open to air. These can be purchased at your local pharmacy.   o Most incisions are closed with absorbable sutures, but if not, the sutures or staples will be removed at your 2 week follow up.  Showering / Bathing:  o If your incision is dry without drainage, you may shower following your discharge home. The dressing is waterproof if well affixed to skin.  o You may shower with the waterproof dressing in place, but please be sure it is adhered to the skin and does not allow water to get underneath.   o It is fine to have water run over the incision after the waterproof dressing has been removed. o Do not vigorously scrub your incision. o Do not soak or submerge in a bath, pool, jacuzzi, ocean, river or lake for 6 weeks after surgery. o If there is continued drainage or you are concerned contact Dr. Gricel Dominguez office prior to showering [phone]   Diet:  o You may advance to your regular diet as tolerated. o Proper nutrition is crucial to healing. Make sure you are eating as healthily as possible and following a balanced. protein-rich diet after your surgery. o Avoid processed foods and eat fruits and vegetables.  Medications:  o It is our goal to keep you as comfortable as possible after your surgery. Please take your medications as prescribed without exceeding the recommended dosage. o It is also important to understand that pain has a cycle. It begins and increases until medication interrupts it. The aim of good pain control is to stop the pain before it becomes intolerable. The key is to stay ahead of the pain.  o We encourage patients to discontinue opioid pain medications as soon as possible after surgery. o The side effects of these medications can be substantial, and the narcotic medications are not mandatory. You may substitute a prescribed narcotic with over-the-counter Tylenol. o Pain medications may cause constipation. Over-the-counter Colace twice daily and Miralax while taking the narcotic medication should help prevent constipation.     o Other side effects of pain medication include dizziness, headache, nausea, vomiting, and urinary retention. o Discontinue the pain medication if you develop itching, rash, shortness of breath, or difficulties swallowing. If these symptoms become severe or are not relieved by discontinuing the medication, you should seek immediate medical attention. o Refills of pain medication are authorized during office hours only (8 AM- 4 PM Monday through Friday). Our office will not prescribe narcotics beyond 6 weeks from the date of your surgery. o Narcotics will not be called into the pharmacy, and, in most cases, you must be seen clinically.  Driving:  o You should not return to driving until you are off all narcotic pain medications and able to safely control and steer a motor vehicle. This may take 6 weeks or more because of the limited shoulder motion and activity.  Airports and metal detectors:  o ID cards are no longer given after joint replacement, as they are not accepted by [employer] security. Most joint replacements do not set off metal detectors. If the detector is set off, just tell the  you have a joint replacement.  Signs/Symptoms of Concern:   o Contact Dr. Ozzie Cates office if any of the following signs or symptoms develop. Please be advised if a problem arises which you feel requires immediate medical attention you should seek medical attention at the closest ER.  - Temperature greater than 101.5 for more than 8 hrs  - Fever and/or chills that persist for greater than 8 hr.  - A sudden increase in pain/swelling/ or tenderness in the back of your calf or thigh that is not relieved with ice/elevation and pain medication.   o Increased drainage from your incision, increased redness or warmth at the area of your incision or a sudden increase in swelling or redness that persists despite ice and elevation      If you have questions or concerns, please call Dr. Ozzie Cates staff    Office: Phone [phone]  Fax [phone]    Office Address: Luiz Mai M.D. [address]   [address] [place], [address]    Aline Washington MD      03/10/22

## 2022-03-10 NOTE — PROGRESS NOTES
Problem: Falls - Risk of  Goal: *Absence of Falls  Description: Document Suri Morrison Fall Risk and appropriate interventions in the flowsheet.   3/10/2022 1327 by Elsy Kirby RN  Outcome: Progressing Towards Goal  Note: Fall Risk Interventions:  Mobility Interventions: Patient to call before getting OOB    Medication Interventions: Patient to call before getting OOB    Elimination Interventions: Call light in reach,Elevated toilet seat,Patient to call for help with toileting needs    3/10/2022 1326 by lEsy Kirby RN  Note: Fall Risk Interventions:  Mobility Interventions: Patient to call before getting OOB    Medication Interventions: Patient to call before getting OOB    Elimination Interventions: Call light in reach,Elevated toilet seat,Patient to call for help with toileting needs       Problem: Patient Education: Go to Patient Education Activity  Goal: Patient/Family Education  Outcome: Progressing Towards Goal     Problem: Patient Education: Go to Patient Education Activity  Goal: Patient/Family Education  Outcome: Progressing Towards Goal     Problem: Upper Extremity Surgical Pathway: Day of Surgery  Goal: Activity/Safety  Outcome: Progressing Towards Goal

## 2022-03-10 NOTE — ANESTHESIA PREPROCEDURE EVALUATION
Relevant Problems   No relevant active problems       Anesthetic History   No history of anesthetic complications            Review of Systems / Medical History  Patient summary reviewed, nursing notes reviewed and pertinent labs reviewed    Pulmonary        Sleep apnea           Neuro/Psych   Within defined limits           Cardiovascular    Hypertension                   GI/Hepatic/Renal  Within defined limits              Endo/Other      Hypothyroidism  Arthritis     Other Findings              Physical Exam    Airway  Mallampati: II  TM Distance: > 6 cm  Neck ROM: normal range of motion   Mouth opening: Normal     Cardiovascular  Regular rate and rhythm,  S1 and S2 normal,  no murmur, click, rub, or gallop             Dental  No notable dental hx       Pulmonary  Breath sounds clear to auscultation               Abdominal  GI exam deferred       Other Findings            Anesthetic Plan    ASA: 2  Anesthesia type: general          Induction: Intravenous  Anesthetic plan and risks discussed with: Patient      Surgeon does not want a block for POP.

## 2022-03-10 NOTE — PROGRESS NOTES
Ortho NP Note    POD# 0  s/p LEFT TOTAL SHOULDER ARTHROPLASTY AND OPEN BICEPS TENODESIS     Pt resting in bed, wife at bedside. Reports he recently took oxycodone with improvement of L shoulder pain. Patient has had something to eat/drink. No nausea. VSS Afebrile. Visit Vitals  /88 (BP 1 Location: Right upper arm, BP Patient Position: Semi fowlers)   Pulse 90   Temp 98.1 °F (36.7 °C)   Resp 20   SpO2 97%         Most Recent Labs:   Lab Results   Component Value Date/Time    Hemoglobin A1c 5.3 03/03/2022 10:13 AM       There is no height or weight on file to calculate BMI. Reference: BMI greater than 30 is classified as obesity and greater than 40 is classified as morbid obesity. STOP BANG Score: + SHRADDHA diagnosis    Voiding status: remains due to void    Aquacel to L shoulder c.d.i. Cryotherapy in place over incision. Sling in place  Bilateral UEs warm, dry. 2+ radial pulses  Sensation and motor intact to upper extremities bilaterally. Foot Pumps for mechanical DVT proph    Plan:  1) Therapy: OT tomorrow  2) Renetta-op Antibiotics Ancef  3) Pain:  Received tylenol in preop. Perioperative anesthesia: General.  Post operative analgesia includes scheduled tylenol, toradol and PRN oxycodone, IV dilaudid depending on severity. 4) GI Prophylaxis: Pepcid BID  5) Hx of Hypothyroidism: Resume home synthroid  6) Hx of HTN: Most recent /88, HR 90. Resume home propranolol tonight and cozaar if SBP > 125 as per protocol. 7) Hx of Gout: Recent flare, on colchicine. Resumed 0.6 mg daily here. 8) Discharge plans: to home with wife' support.        Elie Fernandez, NP

## 2022-03-10 NOTE — ANESTHESIA POSTPROCEDURE EVALUATION
Post-Anesthesia Evaluation and Assessment    Patient: Rehana Heredia MRN: [id number]  SSN: [id number]    YOB: 1970  Age: 45 y.o. Sex: male      I have evaluated the patient and they are stable and ready for discharge from the PACU. Cardiovascular Function/Vital Signs  Visit Vitals  BP (!) 145/105   Pulse 82   Temp 36.7 °C (98.1 °F)   Resp 11   SpO2 95%       Patient is status post General anesthesia for Procedure(s):  LEFT TOTAL SHOULDER ARTHROPLASTY AND OPEN BICEPS TENODESIS. Nausea/Vomiting: None    Postoperative hydration reviewed and adequate. Pain:  Pain Scale 1: Numeric (0 - 10) (03/10/22 3378)  Pain Intensity 1: 2 (03/10/22 3371)   Managed    Neurological Status:   Neuro (WDL): Within Defined Limits (03/10/22 0624)   At baseline    Mental Status, Level of Consciousness: Alert and  oriented to person, place, and time    Pulmonary Status:   O2 Device: None (03/10/22 1119)   Adequate oxygenation and airway patent    Complications related to anesthesia: None    Post-anesthesia assessment completed. No concerns    Signed By: Maura Boswell MD     March 10, 2022              Procedure(s):  LEFT TOTAL SHOULDER ARTHROPLASTY AND OPEN BICEPS TENODESIS. general    <BSHSIANPOST>    INITIAL Post-op Vital signs:   Vitals Value Taken Time   /105 03/10/22 1125   Temp 36.7 °C (98.1 °F) 03/10/22 1119   Pulse 73 03/10/22 1129   Resp 15 03/10/22 1129   SpO2 95 % 03/10/22 1129   Vitals shown include unvalidated device data.

## 2022-03-10 NOTE — PROGRESS NOTES
Physical Therapy  3/10/2022    Order received, chart reviewed. Patient POD 0 from L TSA with open biceps tenodesis. F/u tomorrow for evaluation. Thank you.     Belle Romano, PT, DPT

## 2022-03-10 NOTE — PROGRESS NOTES
TRANSFER - IN REPORT:    Verbal report received from Gail Villatoro (name) on Carrie Putnam  being received from PACU(unit) for routine post - op      Report consisted of patients Situation, Background, Assessment and   Recommendations(SBAR). Information from the following report(s) SBAR, Kardex, Procedure Summary, Intake/Output and MAR was reviewed with the receiving nurse. Opportunity for questions and clarification was provided. Assessment completed upon patients arrival to unit and care assumed.

## 2022-03-10 NOTE — OP NOTES
Operative Report    Patient Name: Carmella Devi    Patient YOB: 1970    [facility] MRN: [id number]    Date of Procedure: 03/10/22    Surgical Location: [facility]    Pre-operative Diagnosis: Left shoulder osteoarthritis    Post-operative Diagnosis: Left shoulder osteoarthritis    Procedure: Left total shoulder arthroplasty, open biceps tenodesis    Surgeon: Jericho Bell MD    Assistant/Staff: Circ-1: Lesly Arenas RN  Circ-Relief: Lilly [facility] mckenzie  Scrub Tech-2: Martha Stearns  Scrub RN-1: Maximiliano Nunez  Scrub RN - Intern: Lorna Oliveira RN  Surg Asst-1: Mandeep Qureshi  Surg Asst-2: Sarwat Marcos Staff: Katerina Oliva    Anesthesia: General     Preoperative IV Antibiotics: Ancef 2g     Findings: osteoarthritis, rotator cuff intact     Estimated Blood Loss: 150 mL     Implants: Tornier Simpliciti stemless shoulder with a size 2 nucleus and a 82x87po Humeral head, Aequalis Perfrom+ Cortiloc augmented glenoid size Large angle 25 degrees     Specimens: None     Complications: None     Disposition: PACU - hemodynamically stable.     Condition: stable        Indication for Procedure: The patient presented to my office on an outpatient basis and was found to have left shoulder osteoarthritis with 20 degrees of retroversion to his native glenoid on preoperative CT scan. He failed nonoperative management. we discussed nonoperative and operative treatment options and elected to proceed with total shoulder arthroplasty. He had previously had a contralateral shoulder replaced 1 year ago by me.     Procedure in Detail:  The patient was met in the preoperative holding area and the left shoulder was marked. A consent form was signed to proceed forth with surgery. 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  The patient was brought into the operating room, given general anesthesia and placed in the beach chair position. The left shoulder was prepped and draped in the usual sterile fashion. A multidisciplinary time-out was performed. Prophylactic antibiotics and tranexamic acid were dosed.      A deltopectoral approach to the shoulder was taken extending from 5 cm medial to the [facility] joint towards the deltoidpectoral interval. The cephalic vein was identified, dissected medially and preserved throughout the case. Later in the case the inferior aspect of the vein was injured and coagulated. I released the subdeltoid adhesions and identified the biceps tendon proximal to the pectoralis tendon insertion. I opened the sheath and used a number 2 Arthrex FiberWire suture to tenodese the biceps into the pectoralis major tendon. I then transversely cut the biceps proximal to the pectoralis tendon. I followed the biceps tendon into the rotator interval. I peeled the subscapularis tendon from the lesser tuberosity and exposed the humeral head. The superior rotator cuff appeared intact to its insertion point and was preserved. He had a very large inferior humeral head osteophyte which was removed. I cut the humeral head at the level of the articular surface in line with the anatomic version. The humeral head had severe wear. I then placed a pin in the center of the humeral head and used it to ream the cut surface. The trial nucleus was placed. A humeral head cover was placed on top of it for protection during retraction.     I then placed retractors into the glenoid and exposed the subscapularis tendon. I released anterior and posterior subscapularis tendon adhesions. The labrum was hypertrophic and displaced anteriorly, it was then removed circumferentially around the glenoid ending with the biceps tendon stump. There is a large calcific density in the posterior aspect of the shoulder which was removed. I used the pin guide on the glenoid surface and alignment where it was preoperatively planned.   A central pin was placed. We then reamed for the anterior native part of the glenoid to the central midline. I placed a trial glenoid component with 15 degrees and 25 degrees and found that the 25 degrees sat against the bone better without rocking. I then drilled the pin hole for the angled reamer and reamed for 15 degrees. Again I placed the 15 degree trial component but there was retroversion of the component more than the anticipated 5 degrees. I felt a 25 degree glenoid component supported him better. This was the same as he had on the contralateral side. I then reamed for the 25 degree angle and placed the trial 25 degrees. It sat flush against the glenoid without rocking. It appeared to be sitting at neutral version. I then used the guide to drill the superior and 2 inferior post holes. The central cortiloc drill was then performed. The surface was irrigated and cleansed. Cement was mixed on the back table and inserted into the peripheral peg holes until it was flush with the surface. I inserted the glenoid component with impaction until it was sitting flush. It was confirmed to be down both anterior and posteriorly against bone. There was no rocking.     I then returned to the proximal humerus. A trial humeral head was placed. The shoulder was reduced, and trial range of motion revealed forward flexion of 160 degrees, internal rotation while in abduction to near 60 degrees, external rotation to 80 degrees in abduction and external rotation to 60 degrees in neutral position without subluxation or dislocation. I could translate the humeral head posteriorly about 50% with self-reduction. I trialed several different humeral head sizes and thicknesses, and found the best ability to be with the 52x23 mm. I placed three #2 FiberWire sutures through the anterior humeral cortex in the bicipital. I then place the final nucleus and humeral head. Again I trialed and had good motion and stability.  The FiberWire sutures were then passed through the subscapularis tendon and secured.     A 3 min Betadine soak was performed and the shoulder was lavaged with irrigation. FiberWire was used to close the rotator interval.  A surgical pain solution injection was given subcutaneously around the shoulder for pain control because he did not have the interscalene block. The fascia was closed with 0 Vicryl simple interrupted suture. The skin was closed with 2-0 Vicryl subcutaneously, followed by subcuticular 3-0 Monocryl with Dermabond. The wound was covered with AquacelAg. Surgical count was correct at the end of the procedure. The patient was placed in a postoperative immobilizer. The patient was awakened from anesthesia and brought to the PACU in stable condition.     Postoperative plan:  The patient will be admitted to the hospital for pain control and to work with occupational therapy. The left arm will be nonweightbearing, and is ok for pendulums and elbow and wrist motion. We will perform physical therapy on an outpatient basis.            Mikala Kelley MD      03/10/22

## 2022-03-10 NOTE — H&P
PAT Nurse Practitioner   Pre-Operative Chart Review/Assessment:-ORTHOPEDIC                   Patient Name:  Eveline Zeng                                                           Age:   45 y.o.    :  1970      Today's Date:  3/7/2022      Date of PAT:   3/3/2022       Date of Surgery:    3/10/2022       Procedure(s):  Left Total Shoulder Arthroplasty and Open Biceps Tenodesis      Surgeon:   Dr. Lazaro Barry                              PLAN:       1)  Medical Clearance:  Dr. Jojo Alejandro       2)  Cardiac Clearance:  EKG and METs reviewed. No further cardiac evaluation requested. EKG from PCP on 3/1/22 on chart. 3)  Diabetic Treatment Consult:  Not indicated. A1c-5.3       4)  Sleep Apnea evaluation:   +SHRADDHA dx. Pt no longer uses mouth guard.        5) Treatment for MRSA/Staph Aureus:  Neg       6) Additional Concerns:  HTN                    Vital Signs:          Vitals:     22 0954   BP: 130/84   Pulse: 60   Temp: 98.3 °F (36.8 °C)   Weight: 104.8 kg (231 lb 0.7 oz)   Height: 6' 1\" (1.854 m)                ____________________________________________  PAST MEDICAL HISTORY       Past Medical History:   Diagnosis Date    Arthritis      Gout attack      Hypertension      Sleep apnea       MOUTH GUARD USED, NOT NOW.  Thyroid disease        ____________________________________________  PAST SURGICAL HISTORY        Past Surgical History:   Procedure Laterality Date    HX COLONOSCOPY        HX HERNIA REPAIR Right 1970     inguinal    HX KNEE ARTHROSCOPY Right      X2    HX ORTHOPAEDIC Right      ACHILLES    HX ORTHOPAEDIC Right 2019     ELBOW    HX SHOULDER REPLACEMENT Right 2020    HX WISDOM TEETH EXTRACTION          ____________________________________________  HOME MEDICATIONS       Current Outpatient Medications   Medication Sig    levothyroxine (SYNTHROID) 200 mcg tablet Take  by mouth Daily (before breakfast).     atorvastatin (LIPITOR) 20 mg tablet Take  by mouth nightly.  propranolol LA (INDERAL LA) 60 mg SR capsule Take  by mouth nightly.  losartan (COZAAR) 25 mg tablet Take  by mouth nightly.  colchicine 0.6 mg tablet Take 0.6 mg by mouth three (3) times daily.  indomethacin (INDOCIN) 50 mg capsule Take 75 mg by mouth three (3) times daily.  multivitamin (ONE A DAY) tablet Take 1 Tablet by mouth daily.  omega 3-dha-epa-fish oil (Fish OiL) 100-160-1,000 mg cap Take  by mouth daily.  cholecalciferol (VITAMIN D3) (5000 Units/125 mcg) tab tablet Take  by mouth daily.      No current facility-administered medications for this encounter.      ____________________________________________  ALLERGIES  Not on File   ____________________________________________  SOCIAL HISTORY  Social History            Tobacco Use    Smoking status: Never Smoker    Smokeless tobacco: Former User   Substance Use Topics    Alcohol use: Yes       Comment: OCCA      ____________________________________________    Internal Administration   First Dose COVID-19, Pfizer Purple top, DILUTE for use, 12+ yrs, 30mcg/0.3mL dose  03/10/2021   Second Dose COVID-19, Pfizer Purple top, DILUTE for use, 12+ yrs, 30mcg/0.3mL dose  03/31/2021       Last COVID Lab Pt to have test done locally and bring DOS.        Labs:   [facility] Outpatient Visit on 03/03/2022   Component Date Value Ref Range Status    Crossmatch Expiration 03/03/2022 03/13/2022,2352    Final    ABO/Rh(D) 03/03/2022 B POSITIVE    Final    Antibody screen 03/03/2022 NEG    Final    INR 03/03/2022 0.9  0.9 - 1.1   Final     A single therapeutic range for Vit K antagonists may not be optimal for all indications - see June, 2008 issue of Chest, American College of Chest Physicians Evidence-Based Clinical Practice Guidelines, 8th Edition.     Prothrombin time 03/03/2022 9.9  9.0 - 11.1 sec Final    Color 03/03/2022 YELLOW/STRAW    Final     Color Reference Range: Straw, Yellow or Dark Yellow    Appearance 03/03/2022 CLEAR  CLEAR   Final    Specific gravity 03/03/2022 1.008  1.003 - 1.030   Final    pH (UA) 03/03/2022 7.0  5.0 - 8.0   Final    Protein 03/03/2022 Negative  NEG mg/dL Final    Glucose 03/03/2022 Negative  NEG mg/dL Final    Ketone 03/03/2022 Negative  NEG mg/dL Final    Bilirubin 03/03/2022 Negative  NEG   Final    Blood 03/03/2022 Negative  NEG   Final    Urobilinogen 03/03/2022 0.2  0.2 - 1.0 EU/dL Final    Nitrites 03/03/2022 Negative  NEG   Final    Leukocyte Esterase 03/03/2022 Negative  NEG   Final    UA:UC IF INDICATED 03/03/2022 CULTURE NOT INDICATED BY UA RESULT  CNI   Final    WBC 03/03/2022 0-4  0 - 4 /hpf Final    RBC 03/03/2022 0-5  0 - 5 /hpf Final    Epithelial cells 03/03/2022 FEW  FEW /lpf Final     Epithelial cell category consists of squamous cells and /or transitional urothelial cells. Renal tubular cells, if present, are separately identified as such.  Bacteria 03/03/2022 Negative  NEG /hpf Final    Hyaline cast 03/03/2022 0-2  0 - 5 /lpf Final    Hemoglobin A1c 03/03/2022 5.3  4.0 - 5.6 % Final     Comment: NEW METHOD  PLEASE NOTE NEW REFERENCE RANGE  (NOTE)  HbA1C Interpretive Ranges  <5.7              Normal  5.7 - 6.4         Consider Prediabetes  >6.5              Consider Diabetes       Est. average glucose 03/03/2022 105  mg/dL Final    Special Requests: 03/03/2022 NO SPECIAL REQUESTS    Final    Culture result: 03/03/2022 MRSA NOT PRESENT    Final         Skin:      Denies open wounds, cuts, sores, rashes or other areas of concern in PAT assessment.             RAYMOND Alaniz MD - 01/11/2022 10:20 AM EST  Formatting of this note is different from the original.  Images from the original note were not included. CARE TEAM:  Patient Care Team:  Chasity Vallejo MD as PCP - General (Family Medicine)  Amanda Siu MD (Inactive) as Referring Physician (Orthopedic Surgery)    ASSESSMENT  Diagnosis Plan   1.  Primary osteoarthritis of left shoulder   2. Left shoulder pain, unspecified chronicity X-ray shoulder left 2+ views (83192)   3. Primary osteoarthritis of right shoulder   4. Status post total shoulder arthroplasty, right   DOS 12/4/20 by me at [facility]     There is no problem list on file for this patient. PLAN  Treatment Plan:   -The patient was previously treated at [address] [place] [address] and I had advised to continue with management by that practice. The patient elected to transfer care.  -We discussed the patient's diagnosis and reviewed the diagnostic imaging.   -We reviewed nonoperative treatment options including rest, ice/heat, compression, elevation  -we discussed surgical management which total shoulder replacement and open biceps tenodesis on the left side. He has previously undergone right total shoulder replacement is very satisfied with the relief. He wants to consider surgery at this point as injection that becoming less effective. He needs to work it into his schedule. He will contact us regarding a surgical date  -we will schedule a preoperative left shoulder CT scan with blueprint protocol for deformity evaluation of the glenoid and rotator cuff. -We discussed the use of anti-inflammtories. He can take over-the-counter anti-inflammatories as needed  -regarding his right shoulder, the shoulder is functioning very well for him he is very satisfied with the relief. 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 It continues to improve.  -we will plan left total shoulder arthroplasty at [facility] when the patient contact us with and approved surgical day.  He will require primary care clearance, CT scan and pre-admission testing    Orders Placed This Encounter    X-ray shoulder left 2+ views ([id number])    BMI >=25 PATIENT INSTRUCTIONS & EDUCATION    BP Elevated Patient Education & Instructions     Return for Post-Op Check. HISTORY OF PRESENT ILLNESS  Chief Complaint: Pain of the Left Shoulder    Age: 45 y.o. Sex: male   Hand-dominance:   History of present illness: Mr. Raymond Flores presents today for evaluation of left shoulder pain. He has a longstanding history of shoulder pain and decreased shoulder motion. He is known to me from my previous practice in [place]. He had very severe arthritis of bilateral shoulders, right more symptomatic than left. He underwent right total shoulder replacement on 12/04/2020. The shoulder has done very well postoperatively, but he did have a brachial plexopathy that has taken a while to improve. As of now he finally has full motion of the fingers showed some residual weakness in the thumb. It is tremendously improved since the initial postoperative numbness and lack of motion to the fingers. The left shoulder is worsening. I have given several injections into bilateral shoulders in the past, most recent being a left shoulder injections on 09/03/2021 at the previous practice. He said it lasted 1 or 2 months showed wants to consider long-term improvement similar to his right side. Pain rating = 4 out of 10     OBJECTIVE  Constitutional: No acute distress. His body mass index is 29.69 kg/m². Eyes: Sclera are nonicteric. Respiratory: No labored breathing. Cardiovascular: No marked edema. Skin: No marked skin ulcers. Neurological: No marked sensory loss noted. Psychiatric: Alert and oriented x3.     Shoulder Musculoskeletal Exam    General    Constitutional: appears stated age, well-developed and well-nourished  Scleral icterus: no  Labored breathing: no  Psychiatric: normal mood and affect and no acute distress  Neurological: alert and oriented x3  Skin: intact    Inspection    Inspection - Right    Ecchymosis: none  Peripheral edema: none  Atrophy: none  Deformity: none  Symmetry: symmetric  Masses: none  Prior incision: deltopectoral  Incision: intact, well-healed, clean and dry  Incisional drainage: none    Inspection - Left    Ecchymosis: none  Peripheral edema: none  Atrophy: none  Symmetry: symmetric  Masses: none  Prior incision: none    Palpation    Palpation - Right    Crepitus: no crepitus  Tenderness: none    Palpation - Left    Crepitus: no crepitus and moderate  Increased warmth: none  Tenderness: none    Range of Motion    Range of Motion - Right    Active ROM: normal  Passive ROM: normal  Active forward elevation: 160  Passive forward elevation: 160  Shoulder active abduction: 120  Passive abduction: 120  Active external rotation at side: 50  Passive external rotation at side: 50  Internal rotation: L3    Range of Motion - Left    Active ROM: abnormal  Active forward elevation: 140  Passive forward elevation: 140  Shoulder active abduction: 90  Passive abduction: 90  Active external rotation at side: 30  Passive external rotation at side: 30  Internal rotation: sacrum    Strength    Strength - Right    External rotation: 5/5  Internal rotation: 5/5  Abduction: 5/5    Strength - Left    External rotation: 4+/5  External rotation: affected by pain  Internal rotation: 4+/5  Internal rotation: affected by pain  Abduction: 5/5    Neurovascular    Neurovascular - Right    Capillary refill: brisk  Axillary nerve sensory distribution: normal  Ulnar nerve sensory distribution: normal  Median nerve sensory distribution: normal  Radial nerve sensory distribution: normal  Musculocutaneous nerve sensory distribution: normal    Neurovascular - Left    Capillary refill: brisk  Axillary nerve sensory distribution: normal  Ulnar nerve sensory distribution: normal  Median nerve sensory distribution: normal  Radial nerve sensory distribution: normal  Musculocutaneous nerve sensory distribution: normal    Scapula    Scapula - Left     Position: normal    Special Tests    Special Tests - Right    Rotator Cuff Signs - Right    Superior escape: negative  External rotation lag sign: negative  Painful arc test: negative    Biceps/agueda Signs - Right    Clicking/popping: negative    Special Tests - Left      Rotator Cuff Signs - Left    Neer's test: positive  Superior escape: negative  Morales test: positive  External rotation lag sign: negative  Supraspinatus: negative  Belly press test: negative  Painful arc test: positive  Drop arm test: negative    IMAGING / STUDIES  Order: XR SHOULDER 2+ VW LEFT - Indication: Left shoulder pain,   unspecified chronicity    X-ray shoulder left 2+ views ([id number])    Result Date: 1/11/2022  GH AP, Outlet, Axillary, AP. Impression: Four view x-rays of the left shoulder some there is severe osteoarthritis of the glenohumeral joint. Is a very large inferior humeral head osteophyte. There is osteophyte extends anterior and posterior on axillary imaging with a separate fragment posteriorly measured 1.5 x 1.2 cm. The glenoid does not appear x-rays ordered an axillary imaging, most consistent with an A2.      PROCEDURES  Procedures    Jin Guillory MD    Electronically signed by Jin Guillory MD at 01/11/2022 1:19 PM EST

## 2022-03-11 VITALS
RESPIRATION RATE: 16 BRPM | TEMPERATURE: 98 F | HEART RATE: 79 BPM | SYSTOLIC BLOOD PRESSURE: 129 MMHG | OXYGEN SATURATION: 99 % | DIASTOLIC BLOOD PRESSURE: 90 MMHG

## 2022-03-11 LAB — HGB BLD-MCNC: 12.5 G/DL (ref 12.1–17)

## 2022-03-11 PROCEDURE — 97530 THERAPEUTIC ACTIVITIES: CPT

## 2022-03-11 PROCEDURE — 85018 HEMOGLOBIN: CPT

## 2022-03-11 PROCEDURE — 74011000250 HC RX REV CODE- 250: Performed by: ORTHOPAEDIC SURGERY

## 2022-03-11 PROCEDURE — 97535 SELF CARE MNGMENT TRAINING: CPT

## 2022-03-11 PROCEDURE — G0378 HOSPITAL OBSERVATION PER HR: HCPCS

## 2022-03-11 PROCEDURE — 36415 COLL VENOUS BLD VENIPUNCTURE: CPT

## 2022-03-11 PROCEDURE — 97110 THERAPEUTIC EXERCISES: CPT

## 2022-03-11 PROCEDURE — 74011250637 HC RX REV CODE- 250/637

## 2022-03-11 PROCEDURE — 74011250637 HC RX REV CODE- 250/637: Performed by: ORTHOPAEDIC SURGERY

## 2022-03-11 PROCEDURE — 74011250636 HC RX REV CODE- 250/636: Performed by: ORTHOPAEDIC SURGERY

## 2022-03-11 PROCEDURE — 97165 OT EVAL LOW COMPLEX 30 MIN: CPT

## 2022-03-11 RX ORDER — AMOXICILLIN 250 MG
1 CAPSULE ORAL 2 TIMES DAILY
Qty: 20 TABLET | Refills: 0 | Status: SHIPPED | OUTPATIENT
Start: 2022-03-11 | End: 2022-03-21

## 2022-03-11 RX ORDER — POLYETHYLENE GLYCOL 3350 17 G/17G
17 POWDER, FOR SOLUTION ORAL DAILY
Qty: 10 PACKET | Refills: 0 | Status: SHIPPED | OUTPATIENT
Start: 2022-03-11 | End: 2022-03-21

## 2022-03-11 RX ORDER — OXYCODONE HYDROCHLORIDE 5 MG/1
5 TABLET ORAL
Qty: 30 TABLET | Refills: 0 | Status: SHIPPED | OUTPATIENT
Start: 2022-03-11 | End: 2022-03-18

## 2022-03-11 RX ORDER — METHOCARBAMOL 500 MG/1
500 TABLET, FILM COATED ORAL
Qty: 30 TABLET | Refills: 0 | Status: SHIPPED | OUTPATIENT
Start: 2022-03-11

## 2022-03-11 RX ADMIN — FAMOTIDINE 20 MG: 20 TABLET, FILM COATED ORAL at 09:16

## 2022-03-11 RX ADMIN — OXYCODONE 5 MG: 5 TABLET ORAL at 01:59

## 2022-03-11 RX ADMIN — ACETAMINOPHEN 650 MG: 325 TABLET ORAL at 06:11

## 2022-03-11 RX ADMIN — THERA TABS 1 TABLET: TAB at 09:16

## 2022-03-11 RX ADMIN — SODIUM CHLORIDE, PRESERVATIVE FREE 10 ML: 5 INJECTION INTRAVENOUS at 06:11

## 2022-03-11 RX ADMIN — COLCHICINE 0.6 MG: 0.6 TABLET, FILM COATED ORAL at 09:16

## 2022-03-11 RX ADMIN — OXYCODONE 5 MG: 5 TABLET ORAL at 06:10

## 2022-03-11 RX ADMIN — KETOROLAC TROMETHAMINE 15 MG: 30 INJECTION, SOLUTION INTRAMUSCULAR; INTRAVENOUS at 06:11

## 2022-03-11 RX ADMIN — SENNOSIDES AND DOCUSATE SODIUM 1 TABLET: 50; 8.6 TABLET ORAL at 09:16

## 2022-03-11 RX ADMIN — Medication 5000 UNITS: at 09:16

## 2022-03-11 RX ADMIN — OXYCODONE 5 MG: 5 TABLET ORAL at 10:21

## 2022-03-11 RX ADMIN — LEVOTHYROXINE SODIUM 200 MCG: 100 TABLET ORAL at 07:30

## 2022-03-11 RX ADMIN — POLYETHYLENE GLYCOL 3350 17 G: 17 POWDER, FOR SOLUTION ORAL at 09:16

## 2022-03-11 NOTE — PROGRESS NOTES
Problem: Falls - Risk of  Goal: *Absence of Falls  Description: Document Romulo Iyer Fall Risk and appropriate interventions in the flowsheet. Outcome: Resolved/Met  Note: Fall Risk Interventions:  Mobility Interventions: Patient to call before getting OOB         Medication Interventions: Patient to call before getting OOB    Elimination Interventions: Call light in reach              Problem: Patient Education: Go to Patient Education Activity  Goal: Patient/Family Education  Outcome: Resolved/Met     Problem: Patient Education: Go to Patient Education Activity  Goal: Patient/Family Education  Outcome: Resolved/Met     Problem: Upper Extremity Surgical Pathway: Day of Surgery  Goal: Off Pathway (Use only if patient is Off Pathway)  Outcome: Resolved/Met  Goal: Activity/Safety  3/11/2022 1036 by Kelly Mason  Outcome: Resolved/Met  3/11/2022 1034 by Kelly OZUNA  Outcome: Progressing Towards Goal  Note: Pt is ambulating safely. Goal: Consults, if ordered  Outcome: Resolved/Met  Goal: Diagnostic Test/Procedures  Outcome: Resolved/Met  Goal: Nutrition/Diet  3/11/2022 1036 by Kelly Mason  Outcome: Resolved/Met  3/11/2022 1034 by Kelly OZUNA  Outcome: Progressing Towards Goal  Note: Pt is tolerating a regular diet. Goal: Medications  Outcome: Resolved/Met  Goal: Respiratory  3/11/2022 1036 by Kelly Mason  Outcome: Resolved/Met  3/11/2022 1034 by Kelly OZUNA  Outcome: Progressing Towards Goal  Note: Pt demonstrated effective use of incentive spirometer.    Goal: Treatments/Interventions/Procedures  Outcome: Resolved/Met  Goal: Psychosocial  Outcome: Resolved/Met  Goal: *Demonstrates progressive activity  Outcome: Resolved/Met  Goal: *Optimal pain control at patient's stated goal  Outcome: Resolved/Met  Goal: *Hemodynamically stable  Outcome: Resolved/Met  Goal: *Labs within defined limits  Outcome: Resolved/Met     Problem: Upper Extremity Surgical Pathway: POD 1  Goal: Off Pathway (Use only if patient is Off Pathway)  Outcome: Resolved/Met  Goal: Activity/Safety  Outcome: Resolved/Met  Goal: Diagnostic Test/Procedures  Outcome: Resolved/Met  Goal: Nutrition/Diet  Outcome: Resolved/Met  Goal: Discharge Planning  Outcome: Resolved/Met  Goal: Medications  Outcome: Resolved/Met  Goal: Respiratory  Outcome: Resolved/Met  Goal: Treatments/Interventions/Procedures  Outcome: Resolved/Met  Goal: Psychosocial  Outcome: Resolved/Met     Problem: Upper Extremity Surgical Pathway: Discharge Outcomes  Goal: *Verbalizes name, dosage, time, side effects, and number of days to continue medications  Outcome: Resolved/Met  Goal: *Describes available resources and support systems  Outcome: Resolved/Met  Goal: *Describes follow-up/return visits to physicians  Outcome: Resolved/Met  Goal: *Tolerating diet  Outcome: Resolved/Met  Goal: *Optimal pain control at patient's stated goal  Outcome: Resolved/Met  Goal: *Lungs clear or at baseline  Outcome: Resolved/Met  Goal: *Afebrile  Outcome: Resolved/Met  Goal: *Incision intact without signs of infection, redness, warmth  Outcome: Resolved/Met  Goal: *Absence of deep venous thrombosis signs and symptoms(Stroke Metric)  Outcome: Resolved/Met  Goal: *Active bowel function  Outcome: Resolved/Met  Goal: *Adequate urinary output  Outcome: Resolved/Met  Goal: *Discharge anxiety minimal  Outcome: Resolved/Met  Goal: *Describes available resources and support systems  Outcome: Resolved/Met  Goal: *Labs within defined limits  Outcome: Resolved/Met  Goal: *Hemodynamically stable  Outcome: Resolved/Met  Goal: *Demonstrates ability to don and doff sling/swath/positioning aid  Outcome: Resolved/Met  Goal: *Modified independence with transfers, ambulation on levels with assistance devices, stair climbing, ADL's  Outcome: Resolved/Met  Goal: *Demonstrates independence with home exercise program  Outcome: Resolved/Met

## 2022-03-11 NOTE — PROGRESS NOTES
Medicare Outpatient Observation Notice (MOON)/ [place] Outpatient Observation Notice (Keyur Silva) provided to patient/representative with verbal explanation of the notice. Time allotted for questions regarding the notice. Patient /representative provided a completed copy of the MOON/VOON notice. Copy placed on bedside chart. The patient is independent and wife plans to transport home. No CM needs at this time.      Marilee Cain RN/CRM

## 2022-03-11 NOTE — PROGRESS NOTES
Physical Therapy Note    Patient is not in need of PT evaluation. Observed ambulating in room day 0 without assistance. Will complete orders.

## 2022-03-11 NOTE — PROGRESS NOTES
Progress Note    Status Post: Left total shoulder arthroplasty, open biceps tenodesis  Date of Surgery: 3/10/22  Surgeon: Dr. Corie Baxter    Subjective:     No acute events over night. Linh iFelds states that he is doing well. His pain has been controllable with the oxycodone. Overall he said it is a soreness in his not really that bad. I recommended that he not have a block because of the contralateral plexopathy that I think may have been due to the block. He has been up and going to the bathroom on his own. Denies CP, SOB, nausea/vomitting, parasthesias.      Objective:     Visit Vitals  /68 (BP 1 Location: Right upper arm, BP Patient Position: Lying)   Pulse 68   Temp 98 °F (36.7 °C)   Resp 18   SpO2 99%       Patient Vitals for the past 24 hrs:   Temp Pulse Resp BP SpO2   03/11/22 0220 98 °F (36.7 °C) 68 18 121/68 99 %   03/10/22 2009 98.3 °F (36.8 °C) (!) 108 18 135/84 95 %   03/10/22 1600 97.8 °F (36.6 °C) 87 20 (!) 128/94 97 %   03/10/22 1500 97.2 °F (36.2 °C) 80 20 117/81 97 %   03/10/22 1400 97.3 °F (36.3 °C) 77 20 111/86 97 %   03/10/22 1303 98.1 °F (36.7 °C) 90 20 114/88 97 %   03/10/22 1230    (!) 130/92 98 %   03/10/22 1227  86 21  99 %   03/10/22 1225  81 15  99 %   03/10/22 1214  87 15 (!) 128/91 98 %   03/10/22 1159  82 13 (!) 131/97 92 %   03/10/22 1144  73 15 (!) 143/96 92 %   03/10/22 1134  77 16 109/76 94 %   03/10/22 1129  73 15 (!) 136/95 95 %   03/10/22 1124  76 15 (!) 134/105 95 %   03/10/22 1119 98.1 °F (36.7 °C) 82 11 (!) 145/105 95 %        Physical Exam:  Gen: NAD, AAOx3  Resp: No acute respiratory distress  MSK:  LUE  Sling in place  Dressing is clean, dry, and intact   Motor intact ain/pin/rad/uln  Sensation is intact to light touch m/r/u/ax              Intake/Output Summary (Last 24 hours) at 3/11/2022 0809  Last data filed at 3/10/2022 1104  Gross per 24 hour   Intake 100 ml   Output 100 ml   Net 0 ml       LABS :  Recent Results (from the past 24 hour(s)) HEMOGLOBIN    Collection Time: 03/11/22  2:07 AM   Result Value Ref Range    HGB 12.5 12.1 - 17.0 g/dL        Assessment:   Lin Baker is a 45y.o. year-old male with Left shoulder osteoarthritis status post total shoulder replacement POD #1    Plan:   -The patient required an overnight stay for pain control and to improve mobility with Physical and Occupational Therapy.  -Weighbearing: NWB LUE. Use sling at all times. OK to remove at least 3 times daily to do shoulder pendulums/elbow/wrist range of motion exercises  -Shoulder precautions: No External rotation past neutral. No active internal rotation  -DVT prophylaxis: SCDs, frequent ambulation  -Antibiotics: for 24h  -Pain control: Continue as needed pain management, ice to operative area  -PT/OT for mobilization and discharge planning  -Incentive Spirometry  -Dispo: Discharge planning to Home today  Follow up with me in office in 10-14 days        Aline Washington MD    03/11/22  8:09 AM        Aline Washington M.D.   [address] [facility] [facility] Office  Cell: [phone]  Office: [phone]  Medical Staff: Elicia Lerner MA

## 2022-03-11 NOTE — PROGRESS NOTES
Problem: Upper Extremity Surgical Pathway: Day of Surgery  Goal: Activity/Safety  Outcome: Progressing Towards Goal  Note: Pt is ambulating safely. Goal: Nutrition/Diet  Outcome: Progressing Towards Goal  Note: Pt is tolerating a regular diet. Goal: Respiratory  Outcome: Progressing Towards Goal  Note: Pt demonstrated effective use of incentive spirometer.

## 2022-03-11 NOTE — PROGRESS NOTES
Ortho NP Note    POD# 1  s/p LEFT TOTAL SHOULDER ARTHROPLASTY AND OPEN BICEPS TENODESIS   Pt seen in room     Pt sitting on EOB eating breakfast.  States pain is well controlled this morning using oxycodone + scheduled medications. Currently rating L shoulder pain 3/10. No complaints of N/V. Aware of plan for OT and then discharge to home. VSS Afebrile. Visit Vitals  /68 (BP 1 Location: Right upper arm, BP Patient Position: Lying)   Pulse 68   Temp 98 °F (36.7 °C)   Resp 18   SpO2 99%       Voiding status: spontaneous void  Output (mL)  Last Bowel Movement Date: 03/10/22 (03/10/22 1303)      Labs    Lab Results   Component Value Date/Time    HGB 12.5 03/11/2022 02:07 AM      Lab Results   Component Value Date/Time    INR 0.9 03/03/2022 10:13 AM         There is no height or weight on file to calculate BMI. : A BMI > 30 is classified as obesity and > 40 is classified as morbid obesity. Aquacel dressing to L shoulder c.d.i, sling in place. Cryotherapy in place over incision  Bilateral UEs warm, dry. 2+ radial pulses. Sensation and motor intact  Foot Pumps for mechanical DVT proph while in bed     PLAN:  1)Therapy: OT this morning. NWB LUE. 2) Anticoagulation: Encouraged early mobilization, bed exercises, and SCD use. 3) Pain - Multimodal approach including cryotherapy, scheduled Tylenol & Toradol with  PRN oxycodone & IV dilaudid and robaxin for muscle spasms. 4) Hx of HTN: Current /68, HR 68. Continue home losartan, propranolol. 5) Readniess for discharge: Discussed discharge including return precautions, medications ordered, wound care, and ongoing therapy--patient expressed understanding.      [x] Vital Signs stable    [x] Hgb stable    [x] + Voiding    [x] Wound intact, drainage minimal    [x] Tolerating PO intake     [] Cleared by PT (OT if applicable)     [] Stair training completed (if applicable)    [] Independent / Contact Guard Assist (household distance)     [] Bed mobility     [] Car transfers     [] ADLs    [x] Adequate pain control on oral medication alone     OT this morning, then discharge to home.      Chava Shaffer NP  Available via [employer]

## 2022-03-11 NOTE — PROGRESS NOTES
Bedside and Verbal shift change report given to Baljit (oncoming nurse) by Guillermo Garcia (offgoing nurse). Report included the following information SBAR, Kardex, Procedure Summary, Intake/Output and MAR.

## 2022-03-11 NOTE — PROGRESS NOTES
I have reviewed discharge instructions with the patient and spouse. The patient and spouse verbalized understanding. A signed copy of pt's discharge instructions were placed on chart.

## 2022-03-11 NOTE — PROGRESS NOTES
OCCUPATIONAL THERAPY EVALUATION/DISCHARGE  Patient: Mabel Domingo (34 y.o. male)  Date: 3/11/2022  Primary Diagnosis: Osteoarthritis of left shoulder [M19.012]  Status post total shoulder arthroplasty, left [Z96.612]  Procedure(s) (LRB):  LEFT TOTAL SHOULDER ARTHROPLASTY AND OPEN BICEPS TENODESIS (Left) 1 Day Post-Op   Precautions:   (L shoulder)- LUE NWB, sling 24/7 aside from 3xday for pendulum exercises, as well as, AROM to elbow/wrist; No active Internal Rotation, no external rotation past neutral    ASSESSMENT  Based on the objective data described below, the patient presents with decreased LUE functionality; however, he is demonstrating a high level of safe functional independence, benefiting from Mod A for bathing and UB dressing, with Modified [place] with ADL related mobility/balance challenges. Pt noted with good understanding of LUE precautions, good sling management, good engagement with pendulum exercises and AROM to elbow/wrist, as well as, good understanding of modified dressing techniques. Pt reports good in-home assist from wife. No DME needs notified. Given pt's good understanding with LUE precautions, modified dressing techniques, high level of safe functional independence and reports of good in home ADL/IADL assist, no other acute OT needs identified, with OT answering pt's questions/concerns and pt thanking therapist for his efforts. Current Level of Function (ADLs/self-care): Mod A for bathing/UB dressing    Functional Outcome Measure: The patient scored 65/100 on the Barthel Index outcome measure.       Other factors to consider for discharge: none     PLAN :  Recommendation for discharge: (in order for the patient to meet his/her long term goals)  To be determined: Per MD protocol    This discharge recommendation:  Has been made in collaboration with the attending provider and/or case management    IF patient discharges home will need the following DME: none       SUBJECTIVE: Patient stated Thanks for your help, everything you went over makes good sense.     OBJECTIVE DATA SUMMARY:   HISTORY:   Past Medical History:   Diagnosis Date    Arthritis     Gout attack     Hypertension     Sleep apnea     MOUTH GUARD USED, NOT NOW.  Thyroid disease      Past Surgical History:   Procedure Laterality Date    HX COLONOSCOPY      HX HERNIA REPAIR Right 01/1970    inguinal    HX KNEE ARTHROSCOPY Right 1982    X2    HX ORTHOPAEDIC Right 2006    ACHILLES    HX ORTHOPAEDIC Right 06/2019    ELBOW    HX SHOULDER REPLACEMENT Right 12/2020    HX WISDOM TEETH EXTRACTION         Prior Level of Function/Environment/Context: Independent, likes woodworking  Expanded or extensive additional review of patient history:     Home Situation  Home Environment: Private residence  # Steps to Enter: 3  Rails to Enter: Yes  Hand Rails : Left  One/Two Story Residence: Two story, live on 1st floor  Living Alone: No  Support Systems: Spouse/Significant Other  Tub or Shower Type: Shower    Hand dominance: Right    EXAMINATION OF PERFORMANCE DEFICITS:  Cognitive/Behavioral Status:  Neurologic State: Alert  Orientation Level: Oriented X4  Cognition: Appropriate for age attention/concentration  Perception: Appears intact  Perseveration: No perseveration noted  Safety/Judgement: Awareness of environment;Home safety; Insight into deficits    Vision/Perceptual:    Corrective Lenses: Glasses    Range of Motion:  AROM: Within functional limits (aside from LUE- shoulder precautions)  PROM: Within functional limits (aside from LUE- shoulder precautions)    Strength:  Strength: Within functional limits (aside from LUE- shoulder precautions)                Coordination:  Coordination: Within functional limits (aside from LUE- shoulder precautions)  Fine Motor Skills-Upper: Left Intact; Right Intact    Gross Motor Skills-Upper: Left Impaired;Right Intact    Tone & Sensation:  Tone: Normal  Sensation: Intact Balance:  Sitting: Intact  Standing: Intact    Functional Mobility and Transfers for ADLs:  Bed Mobility:  Rolling: Independent  Supine to Sit: Independent  Sit to Supine: Independent  Scooting: Independent    Transfers:  Sit to Stand: Independent  Stand to Sit: Independent  Bed to Chair: Independent  Bathroom Mobility: Independent  Toilet Transfer : Independent    ADL Assessment:  Feeding: Modified independent    Oral Facial Hygiene/Grooming: Modified Independent    Bathing: Moderate assistance    Upper Body Dressing: Maximum assistance    Lower Body Dressing: Modified independent    Toileting: Modified independent    ADL Intervention and task modifications:  Patient instructed and demonstrated shoulder precautions during ADLs with Min verbal cues progressing to no cues. Patient instructed and indicated Excellent understanding propping surgical arm on surface, can back away from arm in order to access axilla to wash, dry, and deodorize. Patient instructed and demonstrated dress LUE first/undress last with Moderate Assistance. Patient instructed and demonstrated compensatory strategies to don sling with Moderate Assistance. Cognitive Retraining  Safety/Judgement: Awareness of environment;Home safety; Insight into deficits    Therapeutic Exercise:    EXERCISE   Sets   Reps   Active Active Assist   Passive   Comments      []                          []                          []                                []                          []                          []                             Elbow flexion/extension 1 7 [x]                          []                          []                          Good technique noted   Wrist flexion/extension 1 7 [x]                          []                          []                          Good technique noted   Finger flexion/extension 1 7 [x]                          []                          []                          Good technique noted Pendulum    1 5 [x]     []     []     Good technique noted     Functional Measure:    Barthel Index:  Bathin  Bladder: 10  Bowels: 10  Groomin  Dressin  Feeding: 10  Mobility: 0  Stairs: 0  Toilet Use: 10  Transfer (Bed to Chair and Back): 15  Total: 65/100      The Barthel ADL Index: Guidelines  1. The index should be used as a record of what a patient does, not as a record of what a patient could do. 2. The main aim is to establish degree of independence from any help, physical or verbal, however minor and for whatever reason. 3. The need for supervision renders the patient not independent. 4. A patient's performance should be established using the best available evidence. Asking the patient, friends/relatives and nurses are the usual sources, but direct observation and common sense are also important. However direct testing is not needed. 5. Usually the patient's performance over the preceding 24-48 hours is important, but occasionally longer periods will be relevant. 6. Middle categories imply that the patient supplies over 50 per cent of the effort. 7. Use of aids to be independent is allowed. Score Interpretation (from [address] [address])    Independent   60-79 Minimally independent   40-59 Partially dependent   20-39 Very dependent   <20 Totally dependent     -Juan Carlos Perez., Barthel, D.W. (1965). Functional evaluation: the Barthel Index. [facility] ([address] (1997). The Barthel activities of daily living index: self-reporting versus actual performance in the old (> or = 75 years). Journal of [address] [place] [address] 45(7), [facility], J..., [facility]., [place]. (). Measuring the change in disability after inpatient rehabilitation; comparison of the responsiveness of the Barthel Index and Functional [place] Measure. Journal of Neurology, Neurosurgery, and Psychiatry, 66(4), 318-719.   -JAKE James, Carolyn op Severino Odonnell M.A. (2004) Assessment of post-stroke quality of life in cost-effectiveness studies: The usefulness of the Barthel Index and the EuroQoL-5D. Quality of Life Research, 15, 128-25       Occupational Therapy Evaluation Charge Determination   History Examination Decision-Making   LOW Complexity : Brief history review  LOW Complexity : 1-3 performance deficits relating to physical, cognitive , or psychosocial skils that result in activity limitations and / or participation restrictions  LOW Complexity : No comorbidities that affect functional and no verbal or physical assistance needed to complete eval tasks       Based on the above components, the patient evaluation is determined to be of the following complexity level: LOW   Pain Rating:  No c/o pain    Activity Tolerance:   Good    After treatment patient left in no apparent distress:    sitting EOB with RN present, call bell within reach    COMMUNICATION/EDUCATION:   The patients plan of care was discussed with: Registered nurse, Case management and PA.      Thank you for this referral.  Mira Torres OT  Time Calculation: 49 mins

## 2022-03-11 NOTE — PROGRESS NOTES
Occupational Therapy    Orders received, chart reviewed and patient evaluated by occupational therapy. Pending progression with skilled acute occupational therapy, recommend: To be determined: Per MD protocol     Recommend with nursing ADLs with supervision/setup, OOB to chair 3x/day and toileting via functional mobility to and from bathroom with Modified [place] and no DME. Thank you for completing as able in order to maintain patient strength, endurance and independence. Full evaluation to follow.      Thank you,  Nighat Larios, OT

## 2022-03-11 NOTE — PROGRESS NOTES
Spiritual Care Partner Volunteer visited patient in Rm [id number] on 3/11/22. Documented by:   Chaplain Aparicio MDiv, MACE  [phone] PR[address] ([phone])

## 2022-03-11 NOTE — DISCHARGE SUMMARY
Ortho Discharge Summary    Patient ID:  Linh Fields  [id number]  male  45 y.o.  1970    Admit date: 3/10/2022    Discharge date: 3/11/2022    Admitting Physician: Kenneth Montes MD     Consulting Physician(s):   Treatment Team: Care Manager: Brunilda Yates, RN; Utilization Review: Pardeep Edmonds    Date of Surgery:   3/10/2022     Preoperative Diagnosis:  LEFT SHOULDER OA    Postoperative Diagnosis:   LEFT SHOULDER OSTEOARTHRITIS    Procedure(s):   LEFT TOTAL SHOULDER ARTHROPLASTY AND OPEN BICEPS TENODESIS     Anesthesia Type:   General     Surgeon: Saqib Wolf MD                            HPI:  Pt is a 45 y.o. male who has a history of LEFT SHOULDER OA  with pain and limitations of activities of daily living who presents at this time for a left LEFT TOTAL SHOULDER ARTHROPLASTY AND OPEN BICEPS TENODESIS following the failure of conservative management. PMH:   Past Medical History:   Diagnosis Date    Arthritis     Gout attack     Hypertension     Sleep apnea     MOUTH GUARD USED, NOT NOW.  Thyroid disease        There is no height or weight on file to calculate BMI. : A BMI > 30 is classified as obesity and > 40 is classified as morbid obesity. Medications upon admission :   Prior to Admission Medications   Prescriptions Last Dose Informant Patient Reported? Taking?   atorvastatin (LIPITOR) 20 mg tablet 3/9/2022 at Unknown time  Yes Yes   Sig: Take  by mouth nightly. cholecalciferol (VITAMIN D3) (5000 Units/125 mcg) tab tablet 3/3/2022 at Unknown time  Yes Yes   Sig: Take  by mouth daily. colchicine 0.6 mg tablet 3/10/2022 at Unknown time  Yes Yes   Sig: Take 0.6 mg by mouth three (3) times daily. indomethacin (INDOCIN) 50 mg capsule 3/3/2022 at Unknown time  Yes Yes   Sig: Take 75 mg by mouth three (3) times daily. levothyroxine (SYNTHROID) 200 mcg tablet 3/10/2022 at Unknown time  Yes Yes   Sig: Take  by mouth Daily (before breakfast).    losartan (COZAAR) 25 mg tablet 3/9/2022 at Unknown time  Yes Yes   Sig: Take  by mouth nightly. multivitamin (ONE A DAY) tablet 3/3/2022 at Unknown time  Yes Yes   Sig: Take 1 Tablet by mouth daily. omega 3-dha-epa-fish oil (Fish OiL) 100-160-1,000 mg cap 3/3/2022 at Unknown time  Yes Yes   Sig: Take  by mouth daily. propranolol LA (INDERAL LA) 60 mg SR capsule 3/9/2022 at 10pm  Yes Yes   Sig: Take  by mouth nightly. Facility-Administered Medications: None        Allergies:  No Known Allergies     Hospital Course: The patient underwent surgery. Complications:  None; patient tolerated the procedure well. Was taken to the PACU in stable condition and then transferred to the ortho floor. Cleared occupational therapy and discharged to home on POD 1. Perioperative Antibiotics:  Ancef     Postoperative Pain Management:  Oxycodone & Tylenol, Toradol    Postoperative transfusions:    Number of units banked PRBCs =   none     Post Op complications: none    Hemoglobin at discharge:    Lab Results   Component Value Date/Time    HGB 12.5 03/11/2022 02:07 AM    INR 0.9 03/03/2022 10:13 AM       Aquacel dressing remained in place - clean, dry and intact. No significant erythema or swelling. Wound appears to be healing without any evidence of infection. Neurovascular exam found to be within normal limits. Physical Therapy started following surgery and participated in bed mobility, transfers and ambulation. Gait:                      Discharged to: Home. Condition on Discharge:   good    Discharge instructions:  - Take pain medications as prescribed  - Resume pre hospital diet      - Discharge activity: activity as tolerated  - Ambulate with assistive device as needed. - Weight bearing status NWB to LUE  - Wound Care Keep wound clean and dry. See discharge instruction sheet.             -DISCHARGE MEDICATION LIST     Discharge Medication List as of 3/11/2022 10:43 AM      START taking these medications    Details   oxyCODONE IR (ROXICODONE) 5 mg immediate release tablet Take 1 Tablet by mouth every four (4) hours as needed for Pain for up to 7 days. Max Daily Amount: 30 mg., Normal, Disp-30 Tablet, R-0      methocarbamoL (ROBAXIN) 500 mg tablet Take 1 Tablet by mouth four (4) times daily as needed for Muscle Spasm(s) or Pain., Normal, Disp-30 Tablet, R-0      polyethylene glycol (MIRALAX) 17 gram packet Take 1 Packet by mouth daily for 10 days. , Normal, Disp-10 Packet, R-0      senna-docusate (PERICOLACE) 8.6-50 mg per tablet Take 1 Tablet by mouth two (2) times a day for 10 days. , Normal, Disp-20 Tablet, R-0         CONTINUE these medications which have NOT CHANGED    Details   levothyroxine (SYNTHROID) 200 mcg tablet Take  by mouth Daily (before breakfast). , Historical Med      atorvastatin (LIPITOR) 20 mg tablet Take  by mouth nightly., Historical Med      propranolol LA (INDERAL LA) 60 mg SR capsule Take  by mouth nightly., Historical Med      losartan (COZAAR) 25 mg tablet Take  by mouth nightly., Historical Med      colchicine 0.6 mg tablet Take 0.6 mg by mouth three (3) times daily. , Historical Med      indomethacin (INDOCIN) 50 mg capsule Take 75 mg by mouth three (3) times daily. , Historical Med      multivitamin (ONE A DAY) tablet Take 1 Tablet by mouth daily. , Historical Med      omega 3-dha-epa-fish oil (Fish OiL) 100-160-1,000 mg cap Take  by mouth daily. , Historical Med      cholecalciferol (VITAMIN D3) (5000 Units/125 mcg) tab tablet Take  by mouth daily. , Historical Med          per medical continuation form      -Follow up in office in 2 weeks      Signed:  Isaiah Choi NP  Orthopaedic Nurse Practitioner    3/11/2022  11:39 AM

## 2022-03-19 PROBLEM — Z96.612 STATUS POST TOTAL SHOULDER ARTHROPLASTY, LEFT: Status: ACTIVE | Noted: 2022-03-10

## 2022-03-20 PROBLEM — M19.012 OSTEOARTHRITIS OF LEFT SHOULDER: Status: ACTIVE | Noted: 2022-03-10

## 2023-02-07 ENCOUNTER — OFFICE VISIT (OUTPATIENT)
Dept: FAMILY MEDICINE CLINIC | Age: 53
End: 2023-02-07
Payer: OTHER GOVERNMENT

## 2023-02-07 ENCOUNTER — LAB ONLY (OUTPATIENT)
Dept: FAMILY MEDICINE CLINIC | Age: 53
End: 2023-02-07

## 2023-02-07 VITALS
WEIGHT: 237.4 LBS | TEMPERATURE: 97.8 F | DIASTOLIC BLOOD PRESSURE: 80 MMHG | BODY MASS INDEX: 31.46 KG/M2 | RESPIRATION RATE: 16 BRPM | HEIGHT: 73 IN | OXYGEN SATURATION: 97 % | HEART RATE: 57 BPM | SYSTOLIC BLOOD PRESSURE: 120 MMHG

## 2023-02-07 DIAGNOSIS — L57.0 AK (ACTINIC KERATOSIS): ICD-10-CM

## 2023-02-07 DIAGNOSIS — I10 PRIMARY HYPERTENSION: ICD-10-CM

## 2023-02-07 DIAGNOSIS — E03.8 HYPOTHYROIDISM DUE TO HASHIMOTO'S THYROIDITIS: Primary | ICD-10-CM

## 2023-02-07 DIAGNOSIS — L81.9 DEPIGMENTATION OF SKIN: ICD-10-CM

## 2023-02-07 DIAGNOSIS — Z12.5 SCREENING PSA (PROSTATE SPECIFIC ANTIGEN): ICD-10-CM

## 2023-02-07 DIAGNOSIS — E03.1 CONGENITAL HYPOTHYROIDISM WITHOUT GOITER: ICD-10-CM

## 2023-02-07 DIAGNOSIS — E06.3 HYPOTHYROIDISM DUE TO HASHIMOTO'S THYROIDITIS: Primary | ICD-10-CM

## 2023-02-07 PROCEDURE — 99203 OFFICE O/P NEW LOW 30 MIN: CPT | Performed by: FAMILY MEDICINE

## 2023-02-07 PROCEDURE — 3074F SYST BP LT 130 MM HG: CPT | Performed by: FAMILY MEDICINE

## 2023-02-07 PROCEDURE — 3079F DIAST BP 80-89 MM HG: CPT | Performed by: FAMILY MEDICINE

## 2023-02-07 NOTE — PROGRESS NOTES
Chief Complaint   Patient presents with    Establish Care     Patient is here today to establish care as a new patient. Vitals:    02/07/23 0848   BP: 120/80   Pulse: (!) 57   Resp: 16   Temp: 97.8 °F (36.6 °C)   TempSrc: Temporal   SpO2: 97%   Weight: 237 lb 6.4 oz (107.7 kg)   Height: 6' 1\" (1.854 m)   PainSc:   0 - No pain       Current Outpatient Medications on File Prior to Visit   Medication Sig Dispense Refill    atorvastatin (LIPITOR) 20 mg tablet Take  by mouth nightly. propranolol LA (INDERAL LA) 60 mg SR capsule Take  by mouth nightly. losartan (COZAAR) 25 mg tablet Take  by mouth nightly. multivitamin (ONE A DAY) tablet Take 1 Tablet by mouth daily. omega 3-dha-epa-fish oil (Fish OiL) 100-160-1,000 mg cap Take  by mouth daily. cholecalciferol (VITAMIN D3) (5000 Units/125 mcg) tab tablet Take  by mouth daily. methocarbamoL (ROBAXIN) 500 mg tablet Take 1 Tablet by mouth four (4) times daily as needed for Muscle Spasm(s) or Pain. (Patient not taking: Reported on 2/7/2023) 30 Tablet 0    levothyroxine (SYNTHROID) 200 mcg tablet Take  by mouth Daily (before breakfast). (Patient not taking: Reported on 2/7/2023)      colchicine 0.6 mg tablet Take 0.6 mg by mouth daily as needed for Gout.      indomethacin (INDOCIN) 50 mg capsule Take 75 mg by mouth three (3) times daily. (Patient not taking: Reported on 2/7/2023)       No current facility-administered medications on file prior to visit. Health Maintenance Due   Topic    Hepatitis C Screening     Depression Screen     Lipid Screen     DTaP/Tdap/Td series (1 - Tdap)    Colorectal Cancer Screening Combo     Shingles Vaccine (1 of 2)    COVID-19 Vaccine (3 - Booster for [employer] series)    Flu Vaccine (1)       1. \"Have you been to the ER, urgent care clinic since your last visit? Hospitalized since your last visit? \" No    2.  \"Have you seen or consulted any other health care providers outside of the [facility] since your last visit? \" No     3. For patients aged 39-70: Has the patient had a colonoscopy / FIT/ Cologuard? Yes - no Care Gap present      If the patient is female:    4. For patients aged 41-77: Has the patient had a mammogram within the past 2 years? NA - based on age or sex      11. For patients aged 21-65: Has the patient had a pap smear?  NA - based on age or sex

## 2023-02-07 NOTE — PROGRESS NOTES
Solitario Hutson (: 1970) is a 47 y.o. male, established patient, here for evaluation of the following chief complaint(s):  Establish Care (Patient is here today to establish care as a new patient.)       ASSESSMENT/PLAN:  Below is the assessment and plan developed based on review of pertinent history, physical exam, labs, studies, and medications. 1. Hypothyroidism due to Hashimoto's thyroiditis  -     HEMOGLOBIN A1C WITH EAG; Future  -     LIPID PANEL; Future  -     TSH 3RD GENERATION; Future  -     T4 (THYROXINE); Future  -     THYROID ANTIBODY PANEL; Future  2. Depigmentation of skin  -     METABOLIC PANEL, COMPREHENSIVE; Future  -     REFERRAL TO DERMATOLOGY  3. AK (actinic keratosis)  -     REFERRAL TO DERMATOLOGY  4. Primary hypertension  -     METABOLIC PANEL, COMPREHENSIVE; Future  -     REFERRAL TO DERMATOLOGY  5. Screening PSA (prostate specific antigen)  -     PSA SCREENING (SCREENING); Future      No follow-ups on file. SUBJECTIVE/OBJECTIVE:  Solitario Hutson is a 47 y.o. male present to establish care, without any complaints. Was diagnosed with high blood pressure about 2 years ago, was told for several years that he has spikes of high blood pressure, and was finally notified that he needed to use blood pressure medications. Losartan and propanolol works. Advised the patient that he should take propanolol in the AM as his heart rate can drop. Patient voiced understanding. Patient take levothyroxine, at a high dose, TSH kept on increasing. Child also takes medication, as did his mother. Patient has some depigmented spots on his arms, sun spots, and has not been evaluated by a dermatologist. Additionally, had a colonoscopy about a year ago, normal, would like PSA checked. No urinary symptoms. Review of Systems   Constitutional:  Negative for activity change, appetite change, chills, fatigue, fever and unexpected weight change. Eyes:  Positive for visual disturbance.  Negative for photophobia, pain, discharge, redness and itching. Respiratory:  Negative for cough, chest tightness, shortness of breath and wheezing. Cardiovascular:  Negative for chest pain and leg swelling. Gastrointestinal:  Negative for constipation, diarrhea, nausea and vomiting. Endocrine: Negative. Genitourinary: Negative. Musculoskeletal:  Positive for arthralgias. Skin:  Positive for color change. Allergic/Immunologic: Negative for environmental allergies, food allergies and immunocompromised state. Neurological: Negative. Hematological: Negative. Psychiatric/Behavioral: Negative. Physical Exam  Constitutional:       Appearance: Normal appearance. He is normal weight. HENT:      Head: Normocephalic and atraumatic. Right Ear: Tympanic membrane, ear canal and external ear normal.      Left Ear: Tympanic membrane, ear canal and external ear normal.      Ears:      Comments: Middle ear effusion     Nose: Nose normal.      Mouth/Throat:      Mouth: Mucous membranes are moist.   Eyes:      Extraocular Movements: Extraocular movements intact. Conjunctiva/sclera: Conjunctivae normal.      Pupils: Pupils are equal, round, and reactive to light. Cardiovascular:      Rate and Rhythm: Normal rate and regular rhythm. Pulses: Normal pulses. Pulmonary:      Effort: Pulmonary effort is normal.      Breath sounds: Normal breath sounds. Abdominal:      General: Abdomen is flat. Bowel sounds are normal.   Musculoskeletal:         General: Normal range of motion. Cervical back: Normal range of motion and neck supple. Skin:     General: Skin is warm. Capillary Refill: Capillary refill takes less than 2 seconds. Neurological:      General: No focal deficit present. Mental Status: He is alert and oriented to person, place, and time. Mental status is at baseline.    Psychiatric:         Mood and Affect: Mood normal.         Behavior: Behavior normal.         Thought Content: Thought content normal.         Judgment: Judgment normal.           An electronic signature was used to authenticate this note.   -- Michael Vang MD

## 2023-02-08 LAB
ALBUMIN SERPL-MCNC: 4.1 G/DL (ref 3.5–5)
ALBUMIN/GLOB SERPL: 1.5 (ref 1.1–2.2)
ALP SERPL-CCNC: 73 U/L (ref 45–117)
ALT SERPL-CCNC: 28 U/L (ref 12–78)
ANION GAP SERPL CALC-SCNC: 6 MMOL/L (ref 5–15)
AST SERPL-CCNC: 20 U/L (ref 15–37)
BILIRUB SERPL-MCNC: 0.6 MG/DL (ref 0.2–1)
BUN SERPL-MCNC: 16 MG/DL (ref 6–20)
BUN/CREAT SERPL: 14 (ref 12–20)
CALCIUM SERPL-MCNC: 9 MG/DL (ref 8.5–10.1)
CHLORIDE SERPL-SCNC: 105 MMOL/L (ref 97–108)
CHOLEST SERPL-MCNC: 174 MG/DL
CO2 SERPL-SCNC: 27 MMOL/L (ref 21–32)
CREAT SERPL-MCNC: 1.11 MG/DL (ref 0.7–1.3)
EST. AVERAGE GLUCOSE BLD GHB EST-MCNC: 97 MG/DL
GLOBULIN SER CALC-MCNC: 2.8 G/DL (ref 2–4)
GLUCOSE SERPL-MCNC: 101 MG/DL (ref 65–100)
HBA1C MFR BLD: 5 % (ref 4–5.6)
HDLC SERPL-MCNC: 39 MG/DL
HDLC SERPL: 4.5 (ref 0–5)
LDLC SERPL CALC-MCNC: 66.4 MG/DL (ref 0–100)
POTASSIUM SERPL-SCNC: 4.3 MMOL/L (ref 3.5–5.1)
PROT SERPL-MCNC: 6.9 G/DL (ref 6.4–8.2)
PSA SERPL-MCNC: 0.9 NG/ML (ref 0.01–4)
SODIUM SERPL-SCNC: 138 MMOL/L (ref 136–145)
T4 SERPL-MCNC: 8.2 UG/DL (ref 4.5–12.1)
TRIGL SERPL-MCNC: 343 MG/DL (ref ?–150)
TSH SERPL DL<=0.05 MIU/L-ACNC: 10.2 UIU/ML (ref 0.36–3.74)
VLDLC SERPL CALC-MCNC: 68.6 MG/DL

## 2023-02-09 LAB
THYROGLOB AB SERPL-ACNC: >2250 IU/ML (ref 0–0.9)
THYROPEROXIDASE AB SERPL-ACNC: 213 IU/ML (ref 0–34)

## 2023-02-09 NOTE — PROGRESS NOTES
Patient has autoimmune thyroid disease: as seen with the TPAB being elevated. Patients TSH is also elevated, meaning, he is not taking enough of the levothyroxine. Advise patient to take a few drops of iodine, in addition to his thyroid medication.  Patient ideally needs to see an endocrinologist.

## 2023-03-27 ENCOUNTER — OFFICE VISIT (OUTPATIENT)
Dept: FAMILY MEDICINE CLINIC | Age: 53
End: 2023-03-27
Payer: OTHER GOVERNMENT

## 2023-03-27 ENCOUNTER — APPOINTMENT (OUTPATIENT)
Dept: FAMILY MEDICINE CLINIC | Age: 53
End: 2023-03-27

## 2023-03-27 VITALS
SYSTOLIC BLOOD PRESSURE: 122 MMHG | HEART RATE: 66 BPM | HEIGHT: 73 IN | RESPIRATION RATE: 16 BRPM | WEIGHT: 235.8 LBS | DIASTOLIC BLOOD PRESSURE: 86 MMHG | BODY MASS INDEX: 31.25 KG/M2 | OXYGEN SATURATION: 98 % | TEMPERATURE: 98.3 F

## 2023-03-27 DIAGNOSIS — I10 PRIMARY HYPERTENSION: Primary | ICD-10-CM

## 2023-03-27 DIAGNOSIS — E03.8 HYPOTHYROIDISM DUE TO HASHIMOTO'S THYROIDITIS: ICD-10-CM

## 2023-03-27 DIAGNOSIS — E06.3 HYPOTHYROIDISM DUE TO HASHIMOTO'S THYROIDITIS: ICD-10-CM

## 2023-03-27 DIAGNOSIS — I10 PRIMARY HYPERTENSION: ICD-10-CM

## 2023-03-27 PROCEDURE — 3074F SYST BP LT 130 MM HG: CPT | Performed by: FAMILY MEDICINE

## 2023-03-27 PROCEDURE — 3078F DIAST BP <80 MM HG: CPT | Performed by: FAMILY MEDICINE

## 2023-03-27 PROCEDURE — 99213 OFFICE O/P EST LOW 20 MIN: CPT | Performed by: FAMILY MEDICINE

## 2023-03-27 RX ORDER — ATORVASTATIN CALCIUM 10 MG/1
10 TABLET, FILM COATED ORAL DAILY
COMMUNITY
End: 2023-03-27

## 2023-03-27 RX ORDER — PROPRANOLOL HYDROCHLORIDE 60 MG/1
CAPSULE, EXTENDED RELEASE ORAL
COMMUNITY
Start: 2020-05-01 | End: 2023-03-27

## 2023-03-27 RX ORDER — LOSARTAN POTASSIUM 25 MG/1
25 TABLET ORAL DAILY
COMMUNITY
Start: 2020-05-01 | End: 2023-03-27

## 2023-03-27 RX ORDER — LEVOTHYROXINE SODIUM 175 UG/1
CAPSULE ORAL
COMMUNITY
Start: 2023-03-27

## 2023-03-27 NOTE — PROGRESS NOTES
Identified pt with two pt identifiers(name and ). Chief Complaint   Patient presents with    Headache     Persistent headaches for x 2 weeks         Health Maintenance Due   Topic    Hepatitis C Screening     DTaP/Tdap/Td series (1 - Tdap)    Colorectal Cancer Screening Combo     Shingles Vaccine (1 of 2)    COVID-19 Vaccine (3 - Booster for Pfizer series)    Flu Vaccine (1)       Wt Readings from Last 3 Encounters:   23 235 lb 12.8 oz (107 kg)   23 237 lb 6.4 oz (107.7 kg)   22 231 lb 0.7 oz (104.8 kg)     Temp Readings from Last 3 Encounters:   23 98.3 °F (36.8 °C) (Temporal)   23 97.8 °F (36.6 °C) (Temporal)   22 98 °F (36.7 °C)     BP Readings from Last 3 Encounters:   23 122/86   23 120/80   22 (!) 129/90     Pulse Readings from Last 3 Encounters:   23 66   23 (!) 57   22 79         Learning Assessment:  :     No flowsheet data found. Depression Screening:  :     3 most recent PHQ Screens 3/27/2023   Little interest or pleasure in doing things Not at all   Feeling down, depressed, irritable, or hopeless Not at all   Total Score PHQ 2 0       Fall Risk Assessment:  :     No flowsheet data found. Abuse Screening:  :     Abuse Screening Questionnaire 3/27/2023   Do you ever feel afraid of your partner? N   Are you in a relationship with someone who physically or mentally threatens you? N   Is it safe for you to go home? Y       Coordination of Care Questionnaire:  :     1) Have you been to an emergency room, urgent care clinic since your last visit? no   Hospitalized since your last visit? no             2) Have you seen or consulted any other health care providers outside of [address] [place] [address] since your last visit? no  (Include any pap smears or colon screenings in this section.)    3) Do you have an Advance Directive on file?  no  Are you interested in receiving information about Advance Directives? no    Patient is accompanied by self I have received verbal consent from Michael Martinez to discuss any/all medical information while they are present in the room. 4.  For patients aged 39-70: Has the patient had a colonoscopy / FIT/ Cologuard? Yes - no Care Gap present had at [facility] in [place]       If the patient is female:    5. For patients aged 41-77: Has the patient had a mammogram within the past 2 years? NA - based on age or sex      10. For patients aged 21-65: Has the patient had a pap smear?  NA - based on age or sex

## 2023-03-28 LAB
T4 SERPL-MCNC: 12.5 UG/DL (ref 4.5–12.1)
TSH SERPL DL<=0.05 MIU/L-ACNC: 12.6 UIU/ML (ref 0.36–3.74)

## 2023-03-28 NOTE — PROGRESS NOTES
Michael Martinez (: 1970) is a 47 y.o. male, established patient, here for evaluation of the following chief complaint(s):  Headache (Persistent headaches for x 2 weeks )       ASSESSMENT/PLAN:  Below is the assessment and plan developed based on review of pertinent history, physical exam, labs, studies, and medications. 1. Primary hypertension  -     TSH 3RD GENERATION; Future  -     T4 (THYROXINE); Future  2. Hypothyroidism due to Hashimoto's thyroiditis      No follow-ups on file. SUBJECTIVE/OBJECTIVE:  Michael Martinez is a 47 y.o. male presents with ongoing headaches for the past 2 weeks, states that they started on 3/10/23 and resolved on 3/23/23. Describes it as being constant, originating from one point on the back of his head, left side. States that the only other thing he can associate it with is a boil that is present on his jaw. States that it subsided once the pus from the boil was expressed. Additionally, patient states that he had started the iodine drops, discussed that he stopped taking them about 2 weeks ago. I advised him that in hindsight, it may the one of the causes of his headaches, and advised him to stop taking them. Additionally, he has to follow up with his endocrinologist.         Review of Systems   Constitutional:  Negative for activity change, appetite change, chills, diaphoresis, fatigue and unexpected weight change. HENT:  Negative for congestion, ear discharge, ear pain, nosebleeds, postnasal drip, rhinorrhea, sinus pressure, sore throat, trouble swallowing and voice change. Eyes:  Negative for pain, discharge, redness, itching and visual disturbance. Respiratory:  Negative for cough, chest tightness, shortness of breath and wheezing. Cardiovascular:  Negative for chest pain, palpitations and leg swelling. Gastrointestinal:  Negative for abdominal distention, constipation, diarrhea, nausea and vomiting. Endocrine: Negative. Genitourinary: Negative. Musculoskeletal:  Negative for arthralgias, back pain, gait problem, joint swelling, myalgias, neck pain and neck stiffness. Skin: Negative. Allergic/Immunologic: Negative for environmental allergies, food allergies and immunocompromised state. Neurological:  Positive for headaches. Negative for seizures, syncope, speech difficulty, weakness, light-headedness and numbness. Hematological: Negative. Psychiatric/Behavioral: Negative. Physical Exam  Constitutional:       Appearance: Normal appearance. He is normal weight. HENT:      Head: Normocephalic and atraumatic. Right Ear: Tympanic membrane, ear canal and external ear normal.      Left Ear: Tympanic membrane, ear canal and external ear normal.      Nose: Nose normal.      Mouth/Throat:      Mouth: Mucous membranes are moist.      Pharynx: Oropharynx is clear. Eyes:      Extraocular Movements: Extraocular movements intact. Conjunctiva/sclera: Conjunctivae normal.      Pupils: Pupils are equal, round, and reactive to light. Cardiovascular:      Rate and Rhythm: Normal rate and regular rhythm. Pulses: Normal pulses. Heart sounds: Normal heart sounds. Pulmonary:      Effort: Pulmonary effort is normal.      Breath sounds: Normal breath sounds. Abdominal:      General: Abdomen is flat. Bowel sounds are normal.   Musculoskeletal:         General: Normal range of motion. Cervical back: Normal range of motion and neck supple. Skin:     General: Skin is warm. Capillary Refill: Capillary refill takes less than 2 seconds. Neurological:      General: No focal deficit present. Mental Status: He is alert and oriented to person, place, and time. Mental status is at baseline. Psychiatric:         Mood and Affect: Mood normal.         Behavior: Behavior normal.         Thought Content:  Thought content normal.         Judgment: Judgment normal.         An electronic signature was used to authenticate this note.  -- Mabel Alcala MD

## 2023-04-04 ENCOUNTER — TELEPHONE (OUTPATIENT)
Dept: FAMILY MEDICINE CLINIC | Age: 53
End: 2023-04-04

## 2023-04-04 NOTE — TELEPHONE ENCOUNTER
4/4/2023  5:18 PM    1.  Hashimoto's thyroiditis  Plan:  - US THYROID/PARATHYROID/SOFT TISS; Future      Ki Singer MD

## 2023-04-17 ENCOUNTER — HOSPITAL ENCOUNTER (OUTPATIENT)
Dept: ULTRASOUND IMAGING | Age: 53
Discharge: HOME | End: 2023-04-17
Attending: FAMILY MEDICINE
Payer: OTHER GOVERNMENT

## 2023-04-17 DIAGNOSIS — E06.3 HASHIMOTO'S THYROIDITIS: ICD-10-CM

## 2023-04-17 PROCEDURE — 76536 US EXAM OF HEAD AND NECK: CPT

## 2023-05-10 ENCOUNTER — OFFICE VISIT (OUTPATIENT)
Age: 53
End: 2023-05-10
Payer: OTHER GOVERNMENT

## 2023-05-10 VITALS
RESPIRATION RATE: 18 BRPM | HEIGHT: 73 IN | BODY MASS INDEX: 31.62 KG/M2 | SYSTOLIC BLOOD PRESSURE: 126 MMHG | WEIGHT: 238.6 LBS | TEMPERATURE: 98.2 F | HEART RATE: 65 BPM | OXYGEN SATURATION: 96 % | DIASTOLIC BLOOD PRESSURE: 90 MMHG

## 2023-05-10 DIAGNOSIS — I10 DIASTOLIC HYPERTENSION: Primary | ICD-10-CM

## 2023-05-10 DIAGNOSIS — E06.3 HASHIMOTO'S THYROIDITIS: ICD-10-CM

## 2023-05-10 PROCEDURE — 99212 OFFICE O/P EST SF 10 MIN: CPT | Performed by: FAMILY MEDICINE

## 2023-05-10 PROCEDURE — 3074F SYST BP LT 130 MM HG: CPT | Performed by: FAMILY MEDICINE

## 2023-05-10 PROCEDURE — 3080F DIAST BP >= 90 MM HG: CPT | Performed by: FAMILY MEDICINE

## 2023-05-10 RX ORDER — LEVOTHYROXINE SODIUM 0.2 MG/1
TABLET ORAL
COMMUNITY
Start: 2023-05-04

## 2023-05-10 SDOH — ECONOMIC STABILITY: INCOME INSECURITY: HOW HARD IS IT FOR YOU TO PAY FOR THE VERY BASICS LIKE FOOD, HOUSING, MEDICAL CARE, AND HEATING?: NOT HARD AT ALL

## 2023-05-10 SDOH — ECONOMIC STABILITY: FOOD INSECURITY: WITHIN THE PAST 12 MONTHS, YOU WORRIED THAT YOUR FOOD WOULD RUN OUT BEFORE YOU GOT MONEY TO BUY MORE.: NEVER TRUE

## 2023-05-10 SDOH — ECONOMIC STABILITY: FOOD INSECURITY: WITHIN THE PAST 12 MONTHS, THE FOOD YOU BOUGHT JUST DIDN'T LAST AND YOU DIDN'T HAVE MONEY TO GET MORE.: NEVER TRUE

## 2023-05-10 SDOH — ECONOMIC STABILITY: TRANSPORTATION INSECURITY
IN THE PAST 12 MONTHS, HAS LACK OF TRANSPORTATION KEPT YOU FROM MEETINGS, WORK, OR FROM GETTING THINGS NEEDED FOR DAILY LIVING?: NO

## 2023-05-10 SDOH — ECONOMIC STABILITY: HOUSING INSECURITY
IN THE LAST 12 MONTHS, WAS THERE A TIME WHEN YOU DID NOT HAVE A STEADY PLACE TO SLEEP OR SLEPT IN A SHELTER (INCLUDING NOW)?: NO

## 2023-05-10 ASSESSMENT — SOCIAL DETERMINANTS OF HEALTH (SDOH): HOW HARD IS IT FOR YOU TO PAY FOR THE VERY BASICS LIKE FOOD, HOUSING, MEDICAL CARE, AND HEATING?: NOT HARD AT ALL

## 2023-05-10 NOTE — PROGRESS NOTES
Identified pt with two pt identifiers(name and ). Chief Complaint   Patient presents with    3 Month Follow-Up     Patient is here for 3 month follow up on headaches and discuss visit with endocrinologist . Patient states headaches have went away         Health Maintenance Due   Topic    HIV screen     Hepatitis C screen     Colorectal Cancer Screen     Shingles vaccine (1 of 2)    COVID-19 Vaccine (4 - Booster for [employer] series)    DTaP/Tdap/Td vaccine (2 - Td or Tdap)       Wt Readings from Last 3 Encounters:   05/10/23 238 lb 9.6 oz (108.2 kg)   23 235 lb 12.8 oz (107 kg)   23 237 lb 6.4 oz (107.7 kg)     Temp Readings from Last 3 Encounters:   05/10/23 98.2 °F (36.8 °C) (Temporal)     BP Readings from Last 3 Encounters:   23 122/86   23 120/80     Pulse Readings from Last 3 Encounters:   23 66   23 57           Depression Screening:  :     PHQ-9 Questionaire 3/27/2023 2023   Little interest or pleasure in doing things 0 0   Feeling down, depressed, or hopeless 0 0   PHQ-9 Total Score 0 0        Fall Risk Assessment:  :   No flowsheet data found. Abuse Screening:  :   No flowsheet data found. Coordination of Care Questionnaire:  :     1. \"Have you been to the ER, urgent care clinic since your last visit? Hospitalized since your last visit? \" No    2. \"Have you seen or consulted any other health care providers outside of the [address] [place] [address] since your last visit? \" endocrinologist Dr. Claude Fendt with [place] [facility]     3. This patient is accompanied in the office by his self. 4. For patients aged 39-70: Has the patient had a colonoscopy / FIT/ Cologuard? Yes - no Care Gap present [facility]       If the patient is female:    5. For patients aged 41-77: Has the patient had a mammogram within the past 2 years? NA - based on age or sex      10. For patients aged 21-65: Has the patient had a pap smear?  NA - based on

## 2023-05-10 NOTE — PROGRESS NOTES
Reagan Oliveros (: 1970) is a 47 y.o. male, established patient, here for evaluation of the following chief complaint(s):  Headache (Persistent headaches for x 2 weeks )       ASSESSMENT/PLAN:  Below is the assessment and plan developed based on review of pertinent history, physical exam, labs, studies, and medications. 1. Primary hypertension  Patient is compliant with losartan, and propanolol. He was seen at the endocrinologist, where his Free T4 was tested, it was normal- he is on propanolol, a beta blocker. States that most of his symptoms of high blood pressure are pulsating and throbbing sensation in the head and temples, sweating and flushed. Has a history of gout, states that he feels these symptoms mostly when he eats certain foods. Hasn't had a gout exacerbation in some time. 2. Hypothyroidism due to Hashimoto's thyroiditis    Patient was seen by his endocrinologist, who increased his levothyroxine and advised him strongly to stop using the iodine, which may have contributed to his headaches. No follow-ups on file. SUBJECTIVE/OBJECTIVE:  Reagan Oliveros is a 47 y.o. male presents for a follow up, states that he is feeling much better. Preventative medicine wise, his brother has a history of multiple polyps in the colon and has had two colonoscopies. His PSA is normal, repeat TG normalized. Patients blood work has all normalized. Review of Systems   Constitutional:  Negative for activity change, appetite change, chills, diaphoresis, fatigue and unexpected weight change. HENT:  Negative for congestion, ear discharge, ear pain, nosebleeds, postnasal drip, rhinorrhea, sinus pressure, sore throat, trouble swallowing and voice change. Eyes:  Negative for pain, discharge, redness, itching and visual disturbance. Respiratory:  Negative for cough, chest tightness, shortness of breath and wheezing. Cardiovascular:  Negative for chest pain, palpitations and leg swelling.

## 2023-12-01 RX ORDER — PROPRANOLOL HCL 60 MG
60 CAPSULE, EXTENDED RELEASE 24HR ORAL DAILY
Qty: 90 CAPSULE | Refills: 0 | Status: SHIPPED | OUTPATIENT
Start: 2023-12-01

## 2023-12-01 RX ORDER — LOSARTAN POTASSIUM 25 MG/1
25 TABLET ORAL DAILY
Qty: 90 TABLET | Refills: 0 | Status: SHIPPED | OUTPATIENT
Start: 2023-12-01

## 2023-12-01 RX ORDER — ATORVASTATIN CALCIUM 20 MG/1
20 TABLET, FILM COATED ORAL DAILY
Qty: 90 TABLET | Refills: 0 | Status: SHIPPED | OUTPATIENT
Start: 2023-12-01

## 2023-12-01 NOTE — TELEPHONE ENCOUNTER
----- Message from Froy Negron sent at 2023 10:33 PM EST -----  Regarding: Prescription Refills  Contact: [phone]  Dr. Katherine Collado,   My prescriptions for Losartan 25mg, Propranolol Extended Release 60mg, and Atorvastatin 20mg are all . Can you submit new prescriptions to [facility] for me? Note that I also take Levothyroxine 200 mcg, but I have a current prescription managed by Dr. Alexandra Mcdonald.     Thank you

## 2024-02-15 ENCOUNTER — OFFICE VISIT (OUTPATIENT)
Age: 54
End: 2024-02-15
Payer: OTHER GOVERNMENT

## 2024-02-15 VITALS
HEART RATE: 62 BPM | TEMPERATURE: 98 F | BODY MASS INDEX: 31.89 KG/M2 | OXYGEN SATURATION: 97 % | WEIGHT: 240.6 LBS | SYSTOLIC BLOOD PRESSURE: 115 MMHG | RESPIRATION RATE: 18 BRPM | HEIGHT: 73 IN | DIASTOLIC BLOOD PRESSURE: 82 MMHG

## 2024-02-15 DIAGNOSIS — J40 TRACHEOBRONCHITIS: Primary | ICD-10-CM

## 2024-02-15 DIAGNOSIS — M1A.0790 IDIOPATHIC CHRONIC GOUT OF FOOT WITHOUT TOPHUS, UNSPECIFIED LATERALITY: ICD-10-CM

## 2024-02-15 DIAGNOSIS — J04.0 LARYNGITIS: ICD-10-CM

## 2024-02-15 PROCEDURE — 99213 OFFICE O/P EST LOW 20 MIN: CPT | Performed by: FAMILY MEDICINE

## 2024-02-15 RX ORDER — ZINC SULFATE 50(220)MG
50 CAPSULE ORAL DAILY
Qty: 30 CAPSULE | Refills: 3 | Status: SHIPPED | OUTPATIENT
Start: 2024-02-15 | End: 2025-02-14

## 2024-02-15 RX ORDER — AMOXICILLIN 500 MG/1
CAPSULE ORAL
COMMUNITY
Start: 2024-02-05

## 2024-02-15 RX ORDER — COLCHICINE 0.6 MG/1
0.6 TABLET ORAL PRN
Qty: 30 TABLET | Refills: 3 | Status: SHIPPED | OUTPATIENT
Start: 2024-02-15

## 2024-02-15 NOTE — PROGRESS NOTES
Identified pt with two pt identifiers(name and ).    Chief Complaint   Patient presents with    Gout     Patient started with a flare up Monday with his gout  and will need refills on his colchicine     URI     Patient started with a chest cold last  and is still having some congestion        Health Maintenance Due   Topic    Hepatitis B vaccine (1 of 3 - 3-dose series)    HIV screen     Hepatitis C screen     Polio vaccine (3 of 3 - Adult catch-up series)    Shingles vaccine (2 of 2)    DTaP/Tdap/Td vaccine (2 - Td or Tdap)    Flu vaccine (1)    COVID-19 Vaccine ( season)    Lipids        Wt Readings from Last 3 Encounters:   05/10/23 108.2 kg (238 lb 9.6 oz)   23 107 kg (235 lb 12.8 oz)   23 107.7 kg (237 lb 6.4 oz)     Temp Readings from Last 3 Encounters:   05/10/23 98.2 °F (36.8 °C) (Temporal)     BP Readings from Last 3 Encounters:   05/10/23 (!) 126/90   23 122/86   23 120/80     Pulse Readings from Last 3 Encounters:   05/10/23 65   23 66   23 57           Depression Screening:  :         3/27/2023    10:00 AM 2023     8:40 AM   PHQ-9 Questionaire   Little interest or pleasure in doing things 0 0   Feeling down, depressed, or hopeless 0 0   PHQ-9 Total Score 0 0        Fall Risk Assessment:  :          No data to display                 Abuse Screening:  :          No data to display                 Coordination of Care Questionnaire:  :     \"Have you been to the ER, urgent care clinic since your last visit?  Hospitalized since your last visit?\"    NO    “Have you seen or consulted any other health care providers outside of [facility] since your last visit?”    NO

## 2024-02-17 NOTE — PROGRESS NOTES
SUBJECTIVE:   Carl Mendoza is a 53 y.o. male who complains of congestion, sore throat, swollen glands, and nasal blockage for 7 days. He denies a history of chills and dizziness and does not a history of asthma. Patient does not smoke cigarettes.     OBJECTIVE:  He appears well, vital signs are as noted. Ears normal.  Throat and pharynx normal.  Neck supple. No adenopathy in the neck. Nose is congested. Sinuses non tender. The chest is clear, without wheezes or rales.    ASSESSMENT:   viral upper respiratory illness    PLAN:  Symptomatic therapy suggested: push fluids, rest, and return office visit prn if symptoms persist or worsen. Lack of antibiotic effectiveness discussed with him. Call or return to clinic prn if these symptoms worsen or fail to improve as anticipated.    1. Tracheobronchitis  2. Laryngitis      3. Idiopathic chronic gout of foot without tophus, unspecified laterality    Patient reports being dehydrated and had eaten poorly. Used the colchicine 2 tablets and then one subsequently, and then on day two used one in the morning and one in the evening, which seemed to help.

## 2024-03-02 RX ORDER — PROPRANOLOL HCL 60 MG
60 CAPSULE, EXTENDED RELEASE 24HR ORAL DAILY
Qty: 90 CAPSULE | Refills: 0 | OUTPATIENT
Start: 2024-03-02

## 2024-03-02 RX ORDER — LOSARTAN POTASSIUM 25 MG/1
25 TABLET ORAL DAILY
Qty: 90 TABLET | Refills: 3 | Status: SHIPPED | OUTPATIENT
Start: 2024-03-02

## 2024-03-02 RX ORDER — ATORVASTATIN CALCIUM 20 MG/1
20 TABLET, FILM COATED ORAL DAILY
Qty: 90 TABLET | Refills: 3 | Status: SHIPPED | OUTPATIENT
Start: 2024-03-02

## 2024-03-02 RX ORDER — LOSARTAN POTASSIUM 25 MG/1
25 TABLET ORAL DAILY
Qty: 90 TABLET | Refills: 0 | OUTPATIENT
Start: 2024-03-02

## 2024-03-02 RX ORDER — ATORVASTATIN CALCIUM 20 MG/1
20 TABLET, FILM COATED ORAL DAILY
Qty: 90 TABLET | Refills: 0 | OUTPATIENT
Start: 2024-03-02

## 2024-03-02 RX ORDER — PROPRANOLOL HCL 60 MG
60 CAPSULE, EXTENDED RELEASE 24HR ORAL DAILY
Qty: 90 CAPSULE | Refills: 3 | Status: SHIPPED | OUTPATIENT
Start: 2024-03-02

## 2024-03-15 ENCOUNTER — TELEPHONE (OUTPATIENT)
Age: 54
End: 2024-03-15

## 2024-03-15 ENCOUNTER — TELEMEDICINE (OUTPATIENT)
Age: 54
End: 2024-03-15
Payer: OTHER GOVERNMENT

## 2024-03-15 DIAGNOSIS — M10.9 ACUTE GOUT OF LEFT FOOT, UNSPECIFIED CAUSE: Primary | ICD-10-CM

## 2024-03-15 PROCEDURE — 99213 OFFICE O/P EST LOW 20 MIN: CPT | Performed by: FAMILY MEDICINE

## 2024-03-18 ASSESSMENT — ENCOUNTER SYMPTOMS
CHEST TIGHTNESS: 0
COLOR CHANGE: 0
WHEEZING: 0
COUGH: 0
SHORTNESS OF BREATH: 0

## 2024-03-18 NOTE — PROGRESS NOTES
Carl Mendoza, was evaluated through a synchronous (real-time) audio-video encounter. The patient (or guardian if applicable) is aware that this is a billable service, which includes applicable co-pays. This Virtual Visit was conducted with patient's (and/or legal guardian's) consent. Patient identification was verified, and a caregiver was present when appropriate.   The patient was located at Home: [address] [place] [address]   [facility] [address]  Provider was located at Facility (Appt Dept): [address] [place] [address] [place] [address]      Carl Mendoza (:  1970) is a Established patient, presenting virtually for evaluation of the following:    Assessment & Plan   Below is the assessment and plan developed based on review of pertinent history, physical exam, labs, studies, and medications.  1. Acute gout of left foot, unspecified cause    No follow-ups on file.       Subjective   Had two more gout flares, most recent flare was of the left big toe, responded well to colchicine. Would like to rule out cardiac and renal causes of gout. We discuss his risk factors that includes dehydration, consumption of certain foods. We also discuss that there are certain foods that help with gout flares, that includes, tart cherry juice and turmeric. Ultimately, patient understands that a lot of the flares are due to diet and hydration. His kidney and cardiac function are fine.       Review of Systems   Constitutional:  Negative for chills, diaphoresis, fatigue and fever.   Respiratory:  Negative for cough, chest tightness, shortness of breath and wheezing.    Cardiovascular:  Negative for chest pain, palpitations and leg swelling.   Musculoskeletal:  Positive for arthralgias and joint swelling. Negative for gait problem, myalgias, neck pain and neck stiffness.   Skin:  Negative for color change, pallor, rash and wound.          Objective   Patient-Reported Vitals  No data recorded     Physical Exam  [INSTRUCTIONS:  \"[x]\"

## 2024-04-18 ENCOUNTER — OFFICE VISIT (OUTPATIENT)
Age: 54
End: 2024-04-18

## 2024-04-18 VITALS
DIASTOLIC BLOOD PRESSURE: 89 MMHG | WEIGHT: 237.2 LBS | RESPIRATION RATE: 18 BRPM | TEMPERATURE: 97.9 F | SYSTOLIC BLOOD PRESSURE: 130 MMHG | OXYGEN SATURATION: 95 % | HEIGHT: 73 IN | BODY MASS INDEX: 31.44 KG/M2 | HEART RATE: 68 BPM

## 2024-04-18 DIAGNOSIS — H04.121 DRY EYE OF RIGHT SIDE: Primary | ICD-10-CM

## 2024-04-18 DIAGNOSIS — H57.89 IRRITATION OF RIGHT EYE: ICD-10-CM

## 2024-04-18 ASSESSMENT — VISUAL ACUITY
OS_CC: 20/20
OD_CC: 20/20

## 2024-04-18 NOTE — PROGRESS NOTES
Carl Mendoza (:  1970) is a 53 y.o. male,New patient, here for evaluation of the following chief complaint(s):  New Patient (Pt presents right eye irritation and pressure x 2 weeks. A bug flew into right eye now with tearing and pressure for which is painful)        Assessment    1. Dry eye of right side  2. Irritation of right eye     Plan    Fluorescin stain was negative for any trauma, scratches or injury to the right eye.  Discussed using lubricating eyedrops. Possibly dry eye syndrome or allergic response.  Requested patient follow-up with ophthalmology to rule out other etiologies such as a blocked tear duct, uveitis or infection.    Also please follow-up with blood pressure recheck within 2 weeks. Currently takes Losartan and propranolol.    I have discussed the results of my assessment, diagnosis and treatment plan with the patient. The patient also understands that early in the process of an illness, an Urgent Care work-up can be falsely reassuring. Therefore, if symptoms change, worsen or do not resolve and remain persistent, they should return to Urgent Care or seek further evaluation in the ED for immediate assessment. Additionally, they should continue care with their Primary provider. No further questions remained and patient was discharged to home.      Subjective      History provided by:  Patient   used: No    52 yo gentleman presents with complaint of right eye irritation. He states while mowing lawn several weeks ago, he believes a bug may have flown into his eye. He states he was wearing glasses and does not believe any debris or abrasion occurred. States his eyes have been watering and thought his tear duct might be clogged. Patient has been traveling and has been massaging his eye. States the eye is now watering more, feels a slight pressure behind the eye and is slightly tender.   Review of Systems    Review of Systems   Constitutional:  Negative for activity

## 2024-04-18 NOTE — PATIENT INSTRUCTIONS
Fluorescein stain negative for ocular injury such as a scratch or major trauma. Use lubricating eye drops and follow up with ophthalmology as soon as possible. If increasing pain in eye present to ED for further evaluation.     Please have follow up for your elevated blood pressure.     I have discussed any testing results, diagnosis and treatment plan. If symptoms worsen please present to your local ED for urgent matters. Otherwise, please follow up with your PCP. Thank you for seeing us today at [facility]. I hope you feel better soon.

## 2024-05-20 SDOH — ECONOMIC STABILITY: FOOD INSECURITY: WITHIN THE PAST 12 MONTHS, THE FOOD YOU BOUGHT JUST DIDN'T LAST AND YOU DIDN'T HAVE MONEY TO GET MORE.: NEVER TRUE

## 2024-05-20 SDOH — ECONOMIC STABILITY: FOOD INSECURITY: WITHIN THE PAST 12 MONTHS, YOU WORRIED THAT YOUR FOOD WOULD RUN OUT BEFORE YOU GOT MONEY TO BUY MORE.: NEVER TRUE

## 2024-05-20 SDOH — ECONOMIC STABILITY: INCOME INSECURITY: HOW HARD IS IT FOR YOU TO PAY FOR THE VERY BASICS LIKE FOOD, HOUSING, MEDICAL CARE, AND HEATING?: NOT HARD AT ALL

## 2024-05-23 ENCOUNTER — TELEPHONE (OUTPATIENT)
Age: 54
End: 2024-05-23

## 2024-05-23 ENCOUNTER — NURSE ONLY (OUTPATIENT)
Age: 54
End: 2024-05-23

## 2024-05-23 ENCOUNTER — OFFICE VISIT (OUTPATIENT)
Age: 54
End: 2024-05-23
Payer: OTHER GOVERNMENT

## 2024-05-23 VITALS
DIASTOLIC BLOOD PRESSURE: 90 MMHG | SYSTOLIC BLOOD PRESSURE: 136 MMHG | OXYGEN SATURATION: 96 % | BODY MASS INDEX: 30.77 KG/M2 | RESPIRATION RATE: 18 BRPM | TEMPERATURE: 98.3 F | HEIGHT: 73 IN | WEIGHT: 232.2 LBS | HEART RATE: 59 BPM

## 2024-05-23 DIAGNOSIS — Z13.220 SCREENING FOR LIPID DISORDERS: Primary | ICD-10-CM

## 2024-05-23 DIAGNOSIS — E06.3 AUTOIMMUNE THYROIDITIS: Primary | ICD-10-CM

## 2024-05-23 DIAGNOSIS — M1A.0720 IDIOPATHIC CHRONIC GOUT OF LEFT FOOT WITHOUT TOPHUS: ICD-10-CM

## 2024-05-23 DIAGNOSIS — E06.3 HASHIMOTO'S THYROIDITIS: ICD-10-CM

## 2024-05-23 DIAGNOSIS — Z13.0 SCREENING FOR DEFICIENCY ANEMIA: ICD-10-CM

## 2024-05-23 DIAGNOSIS — E06.3 AUTOIMMUNE THYROIDITIS: ICD-10-CM

## 2024-05-23 LAB
APPEARANCE UR: CLEAR
BACTERIA URNS QL MICRO: NEGATIVE /HPF
BILIRUB UR QL: NEGATIVE
COLOR UR: ABNORMAL
EPITH CASTS URNS QL MICRO: ABNORMAL /LPF
GLUCOSE UR STRIP.AUTO-MCNC: NEGATIVE MG/DL
HGB UR QL STRIP: NEGATIVE
HYALINE CASTS URNS QL MICRO: ABNORMAL /LPF (ref 0–5)
KETONES UR QL STRIP.AUTO: NEGATIVE MG/DL
LEUKOCYTE ESTERASE UR QL STRIP.AUTO: ABNORMAL
NITRITE UR QL STRIP.AUTO: NEGATIVE
PH UR STRIP: 7.5 (ref 5–8)
PROT UR STRIP-MCNC: NEGATIVE MG/DL
RBC #/AREA URNS HPF: ABNORMAL /HPF (ref 0–5)
SP GR UR REFRACTOMETRY: <1.005 (ref 1–1.03)
URINE CULTURE IF INDICATED: ABNORMAL
UROBILINOGEN UR QL STRIP.AUTO: 0.2 EU/DL (ref 0.2–1)
WBC URNS QL MICRO: ABNORMAL /HPF (ref 0–4)

## 2024-05-23 PROCEDURE — 99213 OFFICE O/P EST LOW 20 MIN: CPT | Performed by: FAMILY MEDICINE

## 2024-05-23 RX ORDER — COLCHICINE 0.6 MG/1
0.6 TABLET ORAL EVERY OTHER DAY
Qty: 90 TABLET | Refills: 1 | Status: SHIPPED | OUTPATIENT
Start: 2024-05-23

## 2024-05-23 NOTE — PROGRESS NOTES
Identified pt with two pt identifiers(name and ).    Chief Complaint   Patient presents with    Kidney Problem     Patient has been having kidney pain the past few weeks    Gout     Patient had a really bad gout flare up     Discuss Labs     Patient would like to complete any labs that are due         Health Maintenance Due   Topic    Hepatitis B vaccine (1 of 3 - 3-dose series)    HIV screen     Hepatitis C screen     Polio vaccine (3 of 3 - Adult catch-up series)    Shingles vaccine (2 of 2)    DTaP/Tdap/Td vaccine (2 - Td or Tdap)    COVID-19 Vaccine ( -  season)    Lipids        Wt Readings from Last 3 Encounters:   24 107.6 kg (237 lb 3.2 oz)   02/15/24 109.1 kg (240 lb 9.6 oz)   05/10/23 108.2 kg (238 lb 9.6 oz)     Temp Readings from Last 3 Encounters:   24 97.9 °F (36.6 °C) (Oral)   02/15/24 98 °F (36.7 °C) (Temporal)   05/10/23 98.2 °F (36.8 °C) (Temporal)     BP Readings from Last 3 Encounters:   24 130/89   02/15/24 115/82   05/10/23 (!) 126/90     Pulse Readings from Last 3 Encounters:   24 68   02/15/24 62   05/10/23 65           Depression Screening:  :         2/15/2024     4:06 PM 3/27/2023    10:00 AM 2023     8:40 AM   PHQ-9 Questionaire   Little interest or pleasure in doing things 0 0 0   Feeling down, depressed, or hopeless 0 0 0   PHQ-9 Total Score 0 0 0        Fall Risk Assessment:  :          No data to display                 Abuse Screening:  :          No data to display                 Coordination of Care Questionnaire:  :     \"Have you been to the ER, urgent care clinic since your last visit?  Hospitalized since your last visit?\"    NO    “Have you seen or consulted any other health care providers outside of [facility] since your last visit?”    NO

## 2024-05-27 ASSESSMENT — ENCOUNTER SYMPTOMS
SINUS PAIN: 0
VOMITING: 0
SORE THROAT: 0
COUGH: 0
DIARRHEA: 0
EYE REDNESS: 0
BLOOD IN STOOL: 0
COLOR CHANGE: 0
WHEEZING: 0
SINUS PRESSURE: 0
ABDOMINAL PAIN: 0
EYE PAIN: 0
NAUSEA: 0
SHORTNESS OF BREATH: 0
EYE ITCHING: 0
EYE DISCHARGE: 0
RHINORRHEA: 0
CONSTIPATION: 0
CHEST TIGHTNESS: 0

## 2024-05-27 NOTE — PROGRESS NOTES
Assessment & Plan   1. Autoimmune thyroiditis  Comments:  compliant on thyroid medications  Orders:  -     Uric Acid; Future  2. Idiopathic chronic gout of left foot without tophus  -     colchicine (COLCRYS) 0.6 MG tablet; Take 1 tablet by mouth every other day, Disp-90 tablet, R-1Normal  -     Urinalysis with Reflex to Culture; Future  -     Comprehensive Metabolic Panel; Future  -     Lipid Panel; Future  -     Uric Acid; Future       No follow-ups on file.       Subjective   Carl Mendoza (:  1970) is a 53 y.o. male,Established patient, here for evaluation of the following chief complaint(s):  Kidney Problem (Patient has been having kidney pain the past few weeks), Gout (Patient had a really bad gout flare up ), and Discuss Labs (Patient would like to complete any labs that are due )      Carl Mendoza is a 53 y.o. male presents for a follow up for recurrent gouty attacks.     Most recent one has last for longer duration.     Kidney Problem  Pertinent negatives include no abdominal pain, arthralgias, chest pain, congestion, coughing, diaphoresis, fatigue, fever, headaches, joint swelling, myalgias, nausea, numbness, rash, sore throat, vomiting or weakness.   Gout  Pertinent negatives include no abdominal pain, arthralgias, chest pain, congestion, coughing, diaphoresis, fatigue, fever, headaches, joint swelling, myalgias, nausea, numbness, rash, sore throat, vomiting or weakness.       Review of Systems   Constitutional:  Negative for activity change, appetite change, diaphoresis, fatigue, fever and unexpected weight change.   HENT:  Negative for congestion, nosebleeds, postnasal drip, rhinorrhea, sinus pressure, sinus pain, sneezing, sore throat and tinnitus.    Eyes:  Negative for pain, discharge, redness and itching.   Respiratory:  Negative for cough, chest tightness, shortness of breath and wheezing.    Cardiovascular:  Negative for chest pain, palpitations and leg swelling.   Gastrointestinal:

## 2024-05-28 ENCOUNTER — NURSE ONLY (OUTPATIENT)
Age: 54
End: 2024-05-28

## 2024-05-28 DIAGNOSIS — M1A.0720 IDIOPATHIC CHRONIC GOUT OF LEFT FOOT WITHOUT TOPHUS: ICD-10-CM

## 2024-05-28 DIAGNOSIS — E06.3 HASHIMOTO'S THYROIDITIS: ICD-10-CM

## 2024-05-28 DIAGNOSIS — E06.3 AUTOIMMUNE THYROIDITIS: ICD-10-CM

## 2024-05-28 DIAGNOSIS — Z13.220 SCREENING FOR LIPID DISORDERS: ICD-10-CM

## 2024-05-28 DIAGNOSIS — Z13.0 SCREENING FOR DEFICIENCY ANEMIA: ICD-10-CM

## 2024-05-29 LAB
ALBUMIN SERPL-MCNC: 3.8 G/DL (ref 3.5–5)
ALBUMIN/GLOB SERPL: 1.3 (ref 1.1–2.2)
ALP SERPL-CCNC: 92 U/L (ref 45–117)
ALT SERPL-CCNC: 34 U/L (ref 12–78)
ANION GAP SERPL CALC-SCNC: 7 MMOL/L (ref 5–15)
AST SERPL-CCNC: 24 U/L (ref 15–37)
BASOPHILS # BLD: 0.1 K/UL (ref 0–0.1)
BASOPHILS NFR BLD: 1 % (ref 0–1)
BILIRUB SERPL-MCNC: 0.7 MG/DL (ref 0.2–1)
BUN SERPL-MCNC: 16 MG/DL (ref 6–20)
BUN/CREAT SERPL: 15 (ref 12–20)
CALCIUM SERPL-MCNC: 9.4 MG/DL (ref 8.5–10.1)
CHLORIDE SERPL-SCNC: 104 MMOL/L (ref 97–108)
CHOLEST SERPL-MCNC: 148 MG/DL
CO2 SERPL-SCNC: 27 MMOL/L (ref 21–32)
CREAT SERPL-MCNC: 1.05 MG/DL (ref 0.7–1.3)
DIFFERENTIAL METHOD BLD: NORMAL
EOSINOPHIL # BLD: 0.2 K/UL (ref 0–0.4)
EOSINOPHIL NFR BLD: 4 % (ref 0–7)
ERYTHROCYTE [DISTWIDTH] IN BLOOD BY AUTOMATED COUNT: 12.1 % (ref 11.5–14.5)
EST. AVERAGE GLUCOSE BLD GHB EST-MCNC: 100 MG/DL
GLOBULIN SER CALC-MCNC: 3 G/DL (ref 2–4)
GLUCOSE SERPL-MCNC: 113 MG/DL (ref 65–100)
HBA1C MFR BLD: 5.1 % (ref 4–5.6)
HCT VFR BLD AUTO: 42.4 % (ref 36.6–50.3)
HDLC SERPL-MCNC: 35 MG/DL
HDLC SERPL: 4.2 (ref 0–5)
HGB BLD-MCNC: 14.7 G/DL (ref 12.1–17)
IMM GRANULOCYTES # BLD AUTO: 0 K/UL (ref 0–0.04)
IMM GRANULOCYTES NFR BLD AUTO: 0 % (ref 0–0.5)
LDLC SERPL CALC-MCNC: 77.2 MG/DL (ref 0–100)
LYMPHOCYTES # BLD: 1.7 K/UL (ref 0.8–3.5)
LYMPHOCYTES NFR BLD: 33 % (ref 12–49)
MCH RBC QN AUTO: 29.3 PG (ref 26–34)
MCHC RBC AUTO-ENTMCNC: 34.7 G/DL (ref 30–36.5)
MCV RBC AUTO: 84.6 FL (ref 80–99)
MONOCYTES # BLD: 0.6 K/UL (ref 0–1)
MONOCYTES NFR BLD: 11 % (ref 5–13)
NEUTS SEG # BLD: 2.6 K/UL (ref 1.8–8)
NEUTS SEG NFR BLD: 51 % (ref 32–75)
NRBC # BLD: 0 K/UL (ref 0–0.01)
NRBC BLD-RTO: 0 PER 100 WBC
PLATELET # BLD AUTO: 248 K/UL (ref 150–400)
PMV BLD AUTO: 10.4 FL (ref 8.9–12.9)
POTASSIUM SERPL-SCNC: 4.5 MMOL/L (ref 3.5–5.1)
PROT SERPL-MCNC: 6.8 G/DL (ref 6.4–8.2)
RBC # BLD AUTO: 5.01 M/UL (ref 4.1–5.7)
SODIUM SERPL-SCNC: 138 MMOL/L (ref 136–145)
TRIGL SERPL-MCNC: 179 MG/DL
URATE SERPL-MCNC: 7.2 MG/DL (ref 3.5–7.2)
VLDLC SERPL CALC-MCNC: 35.8 MG/DL
WBC # BLD AUTO: 5.1 K/UL (ref 4.1–11.1)

## 2024-05-31 LAB
T4 FREE SERPL-MCNC: 2.05 NG/DL (ref 0.82–1.77)
TSH SERPL DL<=0.05 MIU/L-ACNC: 0.18 UIU/ML (ref 0.45–4.5)

## 2024-08-30 ENCOUNTER — TELEMEDICINE (OUTPATIENT)
Age: 54
End: 2024-08-30
Payer: OTHER GOVERNMENT

## 2024-08-30 DIAGNOSIS — Z87.19 HISTORY OF ANAL FISSURES: Primary | ICD-10-CM

## 2024-08-30 DIAGNOSIS — L29.0 ANAL PRURITUS: ICD-10-CM

## 2024-08-30 PROCEDURE — 99214 OFFICE O/P EST MOD 30 MIN: CPT | Performed by: FAMILY MEDICINE

## 2024-09-03 ASSESSMENT — ENCOUNTER SYMPTOMS
WHEEZING: 0
EYE DISCHARGE: 0
SHORTNESS OF BREATH: 0
ABDOMINAL PAIN: 0
SORE THROAT: 0
EYE REDNESS: 0
BLOOD IN STOOL: 0
COUGH: 0
CHEST TIGHTNESS: 0
NAUSEA: 0
CONSTIPATION: 0
SINUS PRESSURE: 0
DIARRHEA: 0
VOMITING: 0
EYE ITCHING: 0
RHINORRHEA: 0
SINUS PAIN: 0
COLOR CHANGE: 0
EYE PAIN: 0

## 2024-09-03 NOTE — PROGRESS NOTES
Carl Mendoza, was evaluated through a synchronous (real-time) audio-video encounter. The patient (or guardian if applicable) is aware that this is a billable service, which includes applicable co-pays. This Virtual Visit was conducted with patient's (and/or legal guardian's) consent. Patient identification was verified, and a caregiver was present when appropriate.   The patient was located at Home: [address] [place] [address]   [facility] [id number]  Provider was located at Facility (Appt Dept): [address] [place] [address] [place] [address]  Confirm you are appropriately licensed, registered, or certified to deliver care in the state where the patient is located as indicated above. If you are not or unsure, please re-schedule the visit: Yes, I confirm.     Carl Mendoza (:  1970) is a Established patient, presenting virtually for evaluation of the following:      Below is the assessment and plan developed based on review of pertinent history, physical exam, labs, studies, and medications.     Assessment & Plan  History of anal fissures  Follow up labs, if all is normal follow up with gastroenterologist.     Orders:    H. Pylori Antigen, Stool; Future    Ova+Parasite + Giardia; Future    Fecal leukocytes; Future    Gastrointestinal Panel, Molecular; Future    Anal pruritus       Orders:    H. Pylori Antigen, Stool; Future    Gastrointestinal Panel, Molecular; Future      No follow-ups on file.       Subjective   Several months of anal itching, fissuring.       Review of Systems   Constitutional:  Negative for activity change, appetite change, diaphoresis, fatigue, fever and unexpected weight change.   HENT:  Negative for congestion, nosebleeds, postnasal drip, rhinorrhea, sinus pressure, sinus pain, sneezing, sore throat and tinnitus.    Eyes:  Negative for pain, discharge, redness and itching.   Respiratory:  Negative for cough, chest tightness, shortness of breath and wheezing.    Cardiovascular:  Negative

## 2024-09-04 DIAGNOSIS — Z87.19 HISTORY OF ANAL FISSURES: ICD-10-CM

## 2024-09-04 DIAGNOSIS — L29.0 ANAL PRURITUS: ICD-10-CM

## 2024-09-06 LAB — WBC #/AREA STL HPF: NORMAL /HPF (ref 0–4)

## 2024-09-07 LAB
H PYLORI AG STL QL IA: NEGATIVE
SPECIMEN SOURCE: NORMAL

## 2024-09-10 LAB
G LAMBLIA AG STL QL IA: NEGATIVE
O+P STL CONC: NORMAL
SPECIMEN SOURCE: NORMAL

## 2024-12-05 ENCOUNTER — APPOINTMENT (OUTPATIENT)
Facility: HOSPITAL | Age: 54
End: 2024-12-05
Payer: OTHER GOVERNMENT

## 2024-12-05 ENCOUNTER — HOSPITAL ENCOUNTER (EMERGENCY)
Facility: HOSPITAL | Age: 54
Discharge: HOME | End: 2024-12-05
Attending: STUDENT IN AN ORGANIZED HEALTH CARE EDUCATION/TRAINING PROGRAM
Payer: OTHER GOVERNMENT

## 2024-12-05 VITALS
OXYGEN SATURATION: 96 % | SYSTOLIC BLOOD PRESSURE: 131 MMHG | HEIGHT: 73 IN | RESPIRATION RATE: 18 BRPM | BODY MASS INDEX: 30.48 KG/M2 | WEIGHT: 230 LBS | HEART RATE: 86 BPM | DIASTOLIC BLOOD PRESSURE: 88 MMHG | TEMPERATURE: 98.4 F

## 2024-12-05 DIAGNOSIS — R51.9 ACUTE NONINTRACTABLE HEADACHE, UNSPECIFIED HEADACHE TYPE: Primary | ICD-10-CM

## 2024-12-05 LAB
ALBUMIN SERPL-MCNC: 3.8 G/DL (ref 3.5–5)
ALBUMIN/GLOB SERPL: 1.2 (ref 1.1–2.2)
ALP SERPL-CCNC: 76 U/L (ref 45–117)
ALT SERPL-CCNC: 47 U/L (ref 12–78)
ANION GAP SERPL CALC-SCNC: 9 MMOL/L (ref 2–12)
AST SERPL-CCNC: 28 U/L (ref 15–37)
BASOPHILS # BLD: 0.1 K/UL (ref 0–0.1)
BASOPHILS NFR BLD: 1 % (ref 0–1)
BILIRUB SERPL-MCNC: 0.4 MG/DL (ref 0.2–1)
BUN SERPL-MCNC: 11 MG/DL (ref 6–20)
BUN/CREAT SERPL: 9 (ref 12–20)
CALCIUM SERPL-MCNC: 8.7 MG/DL (ref 8.5–10.1)
CHLORIDE SERPL-SCNC: 100 MMOL/L (ref 97–108)
CO2 SERPL-SCNC: 28 MMOL/L (ref 21–32)
CREAT SERPL-MCNC: 1.16 MG/DL (ref 0.7–1.3)
DIFFERENTIAL METHOD BLD: NORMAL
EOSINOPHIL # BLD: 0.3 K/UL (ref 0–0.4)
EOSINOPHIL NFR BLD: 4 % (ref 0–7)
ERYTHROCYTE [DISTWIDTH] IN BLOOD BY AUTOMATED COUNT: 12.4 % (ref 11.5–14.5)
GLOBULIN SER CALC-MCNC: 3.2 G/DL (ref 2–4)
GLUCOSE SERPL-MCNC: 115 MG/DL (ref 65–100)
HCT VFR BLD AUTO: 40.6 % (ref 36.6–50.3)
HGB BLD-MCNC: 14.7 G/DL (ref 12.1–17)
IMM GRANULOCYTES # BLD AUTO: 0 K/UL (ref 0–0.04)
IMM GRANULOCYTES NFR BLD AUTO: 0 % (ref 0–0.5)
LYMPHOCYTES # BLD: 2.8 K/UL (ref 0.8–3.5)
LYMPHOCYTES NFR BLD: 35 % (ref 12–49)
MCH RBC QN AUTO: 30 PG (ref 26–34)
MCHC RBC AUTO-ENTMCNC: 36.2 G/DL (ref 30–36.5)
MCV RBC AUTO: 82.9 FL (ref 80–99)
MONOCYTES # BLD: 0.6 K/UL (ref 0–1)
MONOCYTES NFR BLD: 7 % (ref 5–13)
NEUTS SEG # BLD: 4.1 K/UL (ref 1.8–8)
NEUTS SEG NFR BLD: 53 % (ref 32–75)
NRBC # BLD: 0 K/UL (ref 0–0.01)
NRBC BLD-RTO: 0 PER 100 WBC
PLATELET # BLD AUTO: 224 K/UL (ref 150–400)
PMV BLD AUTO: 9.8 FL (ref 8.9–12.9)
POTASSIUM SERPL-SCNC: 3.7 MMOL/L (ref 3.5–5.1)
PROT SERPL-MCNC: 7 G/DL (ref 6.4–8.2)
RBC # BLD AUTO: 4.9 M/UL (ref 4.1–5.7)
SODIUM SERPL-SCNC: 137 MMOL/L (ref 136–145)
TROPONIN I SERPL HS-MCNC: 6 NG/L (ref 0–76)
WBC # BLD AUTO: 7.8 K/UL (ref 4.1–11.1)

## 2024-12-05 PROCEDURE — 84484 ASSAY OF TROPONIN QUANT: CPT

## 2024-12-05 PROCEDURE — 36415 COLL VENOUS BLD VENIPUNCTURE: CPT

## 2024-12-05 PROCEDURE — 96374 THER/PROPH/DIAG INJ IV PUSH: CPT

## 2024-12-05 PROCEDURE — 80053 COMPREHEN METABOLIC PANEL: CPT

## 2024-12-05 PROCEDURE — 99284 EMERGENCY DEPT VISIT MOD MDM: CPT

## 2024-12-05 PROCEDURE — 96375 TX/PRO/DX INJ NEW DRUG ADDON: CPT

## 2024-12-05 PROCEDURE — 6360000002 HC RX W HCPCS: Performed by: STUDENT IN AN ORGANIZED HEALTH CARE EDUCATION/TRAINING PROGRAM

## 2024-12-05 PROCEDURE — 85025 COMPLETE CBC W/AUTO DIFF WBC: CPT

## 2024-12-05 PROCEDURE — 70450 CT HEAD/BRAIN W/O DYE: CPT

## 2024-12-05 RX ORDER — KETOROLAC TROMETHAMINE 30 MG/ML
15 INJECTION, SOLUTION INTRAMUSCULAR; INTRAVENOUS
Status: COMPLETED | OUTPATIENT
Start: 2024-12-05 | End: 2024-12-05

## 2024-12-05 RX ORDER — PROCHLORPERAZINE EDISYLATE 5 MG/ML
10 INJECTION INTRAMUSCULAR; INTRAVENOUS
Status: COMPLETED | OUTPATIENT
Start: 2024-12-05 | End: 2024-12-05

## 2024-12-05 RX ORDER — DIPHENHYDRAMINE HYDROCHLORIDE 50 MG/ML
50 INJECTION INTRAMUSCULAR; INTRAVENOUS
Status: COMPLETED | OUTPATIENT
Start: 2024-12-05 | End: 2024-12-05

## 2024-12-05 RX ADMIN — PROCHLORPERAZINE EDISYLATE 10 MG: 5 INJECTION INTRAMUSCULAR; INTRAVENOUS at 19:46

## 2024-12-05 RX ADMIN — DIPHENHYDRAMINE HYDROCHLORIDE 50 MG: 50 INJECTION INTRAMUSCULAR; INTRAVENOUS at 19:46

## 2024-12-05 RX ADMIN — KETOROLAC TROMETHAMINE 15 MG: 30 INJECTION, SOLUTION INTRAMUSCULAR at 19:45

## 2024-12-05 ASSESSMENT — PAIN DESCRIPTION - DESCRIPTORS: DESCRIPTORS: THROBBING

## 2024-12-05 ASSESSMENT — PAIN DESCRIPTION - LOCATION: LOCATION: HEAD

## 2024-12-05 ASSESSMENT — PAIN DESCRIPTION - PAIN TYPE: TYPE: ACUTE PAIN

## 2024-12-05 ASSESSMENT — PAIN SCALES - GENERAL: PAINLEVEL_OUTOF10: 8

## 2024-12-05 ASSESSMENT — PAIN DESCRIPTION - ONSET: ONSET: ON-GOING

## 2024-12-05 ASSESSMENT — PAIN - FUNCTIONAL ASSESSMENT
PAIN_FUNCTIONAL_ASSESSMENT: PREVENTS OR INTERFERES SOME ACTIVE ACTIVITIES AND ADLS
PAIN_FUNCTIONAL_ASSESSMENT: 0-10

## 2024-12-05 ASSESSMENT — PAIN DESCRIPTION - FREQUENCY: FREQUENCY: CONTINUOUS

## 2024-12-05 ASSESSMENT — PAIN DESCRIPTION - ORIENTATION: ORIENTATION: POSTERIOR

## 2024-12-06 NOTE — ED PROVIDER NOTES
time.      Cranial Nerves: No cranial nerve deficit.      Sensory: No sensory deficit.      Motor: No weakness.      Coordination: Coordination normal.         DIAGNOSTIC RESULTS     EKG: All EKG's are interpreted by the Emergency Department Physician who either signs or Co-signs this chart in the absence of a cardiologist.        RADIOLOGY:   Non-plain film images such as CT, Ultrasound and MRI are read by the radiologist. Plain radiographic images are visualized and preliminarily interpreted by the emergency physician with the below findings:        Interpretation per the Radiologist below, if available at the time of this note:    CT Head W/O Contrast   Final Result   Addendum (preliminary) 1 of 1   Addendum: Addendum   Correction:   Study is an unenhanced Head CT.      INDICATION: Headache      EXAM: CT HEAD without contrast.      TECHNIQUE: Unenhanced CT Head is performed. CT dose reduction was achieved   through use of a standardized protocol tailored for this examination and   automatic exposure control for dose modulation.      FINDINGS:   No acute infarct is seen. There is no apparent mass. There is no bleed,    shift,   hydrocephalus or significant extra-axial fluid collection. Bone windows    are   unremarkable.      IMPRESSION: No acute intracranial abnormality.         Electronically signed by MASSIEL URBAN      Final   Normal two view chest x-ray.         Electronically signed by MASSIEL URBAN           LABS:  Labs Reviewed   COMPREHENSIVE METABOLIC PANEL W/ REFLEX TO MG FOR LOW K - Abnormal; Notable for the following components:       Result Value    Glucose 115 (*)     BUN/Creatinine Ratio 9 (*)     All other components within normal limits   CBC WITH AUTO DIFFERENTIAL   TROPONIN       All other labs were within normal range or not returned as of this dictation.    EMERGENCY DEPARTMENT COURSE and DIFFERENTIAL DIAGNOSIS/MDM:   Vitals:    Vitals:    12/05/24 1945 12/05/24 2015 12/05/24 2029 12/05/24

## 2024-12-06 NOTE — ED TRIAGE NOTES
Pt ambulates to treatment area without any gait disturbances.  PT states he has had off and on headaches for 3-4 weeks but they have intensified in the last 4-5 days.  Pt states he does not have a hx of migraines but does have a hx of high BP

## 2024-12-11 ENCOUNTER — OFFICE VISIT (OUTPATIENT)
Age: 54
End: 2024-12-11
Payer: OTHER GOVERNMENT

## 2024-12-11 VITALS
HEIGHT: 73 IN | BODY MASS INDEX: 31.28 KG/M2 | WEIGHT: 236 LBS | TEMPERATURE: 97.9 F | HEART RATE: 71 BPM | RESPIRATION RATE: 16 BRPM | OXYGEN SATURATION: 96 % | SYSTOLIC BLOOD PRESSURE: 138 MMHG | DIASTOLIC BLOOD PRESSURE: 82 MMHG

## 2024-12-11 DIAGNOSIS — M62.838 MUSCLE SPASM: ICD-10-CM

## 2024-12-11 DIAGNOSIS — I10 PRIMARY HYPERTENSION: ICD-10-CM

## 2024-12-11 DIAGNOSIS — R51.9 ACHING HEADACHE: Primary | ICD-10-CM

## 2024-12-11 PROCEDURE — 99213 OFFICE O/P EST LOW 20 MIN: CPT | Performed by: FAMILY MEDICINE

## 2024-12-11 ASSESSMENT — PATIENT HEALTH QUESTIONNAIRE - PHQ9
3. TROUBLE FALLING OR STAYING ASLEEP: NOT AT ALL
SUM OF ALL RESPONSES TO PHQ QUESTIONS 1-9: 0
SUM OF ALL RESPONSES TO PHQ QUESTIONS 1-9: 0
2. FEELING DOWN, DEPRESSED OR HOPELESS: NOT AT ALL
10. IF YOU CHECKED OFF ANY PROBLEMS, HOW DIFFICULT HAVE THESE PROBLEMS MADE IT FOR YOU TO DO YOUR WORK, TAKE CARE OF THINGS AT HOME, OR GET ALONG WITH OTHER PEOPLE: NOT DIFFICULT AT ALL
6. FEELING BAD ABOUT YOURSELF - OR THAT YOU ARE A FAILURE OR HAVE LET YOURSELF OR YOUR FAMILY DOWN: NOT AT ALL
1. LITTLE INTEREST OR PLEASURE IN DOING THINGS: NOT AT ALL
4. FEELING TIRED OR HAVING LITTLE ENERGY: NOT AT ALL
9. THOUGHTS THAT YOU WOULD BE BETTER OFF DEAD, OR OF HURTING YOURSELF: NOT AT ALL
8. MOVING OR SPEAKING SO SLOWLY THAT OTHER PEOPLE COULD HAVE NOTICED. OR THE OPPOSITE, BEING SO FIGETY OR RESTLESS THAT YOU HAVE BEEN MOVING AROUND A LOT MORE THAN USUAL: NOT AT ALL
SUM OF ALL RESPONSES TO PHQ QUESTIONS 1-9: 0
SUM OF ALL RESPONSES TO PHQ QUESTIONS 1-9: 0
5. POOR APPETITE OR OVEREATING: NOT AT ALL
SUM OF ALL RESPONSES TO PHQ9 QUESTIONS 1 & 2: 0
7. TROUBLE CONCENTRATING ON THINGS, SUCH AS READING THE NEWSPAPER OR WATCHING TELEVISION: NOT AT ALL

## 2024-12-11 NOTE — PROGRESS NOTES
Identified pt with two pt identifiers(name and )    Chief Complaint   Patient presents with    Headache     Having headaches for the past month. Went to [facility] ER         Health Maintenance Due   Topic    HIV screen     Hepatitis C screen     Hepatitis B vaccine (1 of 3 - 19+ 3-dose series)    Polio vaccine (3 of 3 - Adult catch-up series)    Shingles vaccine (2 of 2)    DTaP/Tdap/Td vaccine (2 - Td or Tdap)    Flu vaccine (1)    COVID-19 Vaccine ( season)       Wt Readings from Last 3 Encounters:   24 107 kg (236 lb)   24 104.3 kg (230 lb)   24 105.3 kg (232 lb 3.2 oz)     Temp Readings from Last 3 Encounters:   24 97.9 °F (36.6 °C) (Temporal)   24 98.4 °F (36.9 °C) (Oral)   24 98.3 °F (36.8 °C) (Temporal)     BP Readings from Last 3 Encounters:   24 138/82   24 131/88   24 (!) 136/90     Pulse Readings from Last 3 Encounters:   24 71   24 86   24 59           Depression Screening:  :         2024     8:43 AM 2/15/2024     4:06 PM 3/27/2023    10:00 AM 2023     8:40 AM   PHQ-9 Questionaire   Little interest or pleasure in doing things 0 0 0 0   Feeling down, depressed, or hopeless 0 0 0 0   Trouble falling or staying asleep, or sleeping too much 0      Feeling tired or having little energy 0      Poor appetite or overeating 0      Feeling bad about yourself - or that you are a failure or have let yourself or your family down 0      Trouble concentrating on things, such as reading the newspaper or watching television 0      Moving or speaking so slowly that other people could have noticed. Or the opposite - being so fidgety or restless that you have been moving around a lot more than usual 0      Thoughts that you would be better off dead, or of hurting yourself in some way 0      PHQ-9 Total Score 0 0 0 0   If you checked off any problems, how difficult have these problems made it for you to do your work, take care of 
Primary hypertension    - telmisartan (MICARDIS) 20 MG tablet; Take 1 tablet by mouth daily  Dispense: 30 tablet; Refill: 3    Nancy Howard MD   No future appointments.

## 2024-12-12 LAB
EKG ATRIAL RATE: 71 BPM
EKG DIAGNOSIS: NORMAL
EKG P AXIS: 43 DEGREES
EKG P-R INTERVAL: 168 MS
EKG Q-T INTERVAL: 406 MS
EKG QRS DURATION: 96 MS
EKG QTC CALCULATION (BAZETT): 441 MS
EKG R AXIS: -17 DEGREES
EKG T AXIS: 24 DEGREES
EKG VENTRICULAR RATE: 71 BPM

## 2024-12-12 RX ORDER — TELMISARTAN 20 MG/1
20 TABLET ORAL DAILY
Qty: 30 TABLET | Refills: 3 | Status: SHIPPED | OUTPATIENT
Start: 2024-12-12

## 2025-01-23 ENCOUNTER — COMMUNITY OUTREACH (OUTPATIENT)
Age: 55
End: 2025-01-23

## 2025-02-26 RX ORDER — PROPRANOLOL HYDROCHLORIDE 60 MG/1
60 CAPSULE, EXTENDED RELEASE ORAL DAILY
Qty: 90 CAPSULE | Refills: 3 | Status: SHIPPED | OUTPATIENT
Start: 2025-02-26

## 2025-02-26 RX ORDER — ATORVASTATIN CALCIUM 20 MG/1
20 TABLET, FILM COATED ORAL DAILY
Qty: 90 TABLET | Refills: 3 | Status: SHIPPED | OUTPATIENT
Start: 2025-02-26

## 2025-03-10 DIAGNOSIS — I10 PRIMARY HYPERTENSION: ICD-10-CM

## 2025-03-10 RX ORDER — TELMISARTAN 20 MG/1
20 TABLET ORAL DAILY
Qty: 90 TABLET | Refills: 3 | Status: SHIPPED | OUTPATIENT
Start: 2025-03-10

## 2025-04-10 ENCOUNTER — OFFICE VISIT (OUTPATIENT)
Age: 55
End: 2025-04-10
Payer: OTHER GOVERNMENT

## 2025-04-10 VITALS
RESPIRATION RATE: 16 BRPM | OXYGEN SATURATION: 96 % | TEMPERATURE: 97.8 F | BODY MASS INDEX: 31.33 KG/M2 | HEIGHT: 73 IN | DIASTOLIC BLOOD PRESSURE: 86 MMHG | HEART RATE: 66 BPM | WEIGHT: 236.4 LBS | SYSTOLIC BLOOD PRESSURE: 122 MMHG

## 2025-04-10 DIAGNOSIS — E66.811 CLASS 1 OBESITY WITHOUT SERIOUS COMORBIDITY WITH BODY MASS INDEX (BMI) OF 31.0 TO 31.9 IN ADULT, UNSPECIFIED OBESITY TYPE: ICD-10-CM

## 2025-04-10 DIAGNOSIS — Z76.89 ENCOUNTER FOR WEIGHT MANAGEMENT: ICD-10-CM

## 2025-04-10 DIAGNOSIS — E03.9 ACQUIRED HYPOTHYROIDISM: ICD-10-CM

## 2025-04-10 DIAGNOSIS — I10 PRIMARY HYPERTENSION: Primary | ICD-10-CM

## 2025-04-10 DIAGNOSIS — E74.39 GLUCOSE INTOLERANCE: ICD-10-CM

## 2025-04-10 PROCEDURE — 99214 OFFICE O/P EST MOD 30 MIN: CPT | Performed by: FAMILY MEDICINE

## 2025-04-10 PROCEDURE — 3079F DIAST BP 80-89 MM HG: CPT | Performed by: FAMILY MEDICINE

## 2025-04-10 PROCEDURE — 3074F SYST BP LT 130 MM HG: CPT | Performed by: FAMILY MEDICINE

## 2025-04-10 SDOH — ECONOMIC STABILITY: FOOD INSECURITY: WITHIN THE PAST 12 MONTHS, THE FOOD YOU BOUGHT JUST DIDN'T LAST AND YOU DIDN'T HAVE MONEY TO GET MORE.: NEVER TRUE

## 2025-04-10 SDOH — ECONOMIC STABILITY: INCOME INSECURITY: IN THE LAST 12 MONTHS, WAS THERE A TIME WHEN YOU WERE NOT ABLE TO PAY THE MORTGAGE OR RENT ON TIME?: NO

## 2025-04-10 SDOH — ECONOMIC STABILITY: FOOD INSECURITY: WITHIN THE PAST 12 MONTHS, YOU WORRIED THAT YOUR FOOD WOULD RUN OUT BEFORE YOU GOT MONEY TO BUY MORE.: NEVER TRUE

## 2025-04-10 SDOH — ECONOMIC STABILITY: TRANSPORTATION INSECURITY
IN THE PAST 12 MONTHS, HAS THE LACK OF TRANSPORTATION KEPT YOU FROM MEDICAL APPOINTMENTS OR FROM GETTING MEDICATIONS?: NO

## 2025-04-10 ASSESSMENT — PATIENT HEALTH QUESTIONNAIRE - PHQ9
6. FEELING BAD ABOUT YOURSELF - OR THAT YOU ARE A FAILURE OR HAVE LET YOURSELF OR YOUR FAMILY DOWN: NOT AT ALL
SUM OF ALL RESPONSES TO PHQ QUESTIONS 1-9: 0
7. TROUBLE CONCENTRATING ON THINGS, SUCH AS READING THE NEWSPAPER OR WATCHING TELEVISION: NOT AT ALL
1. LITTLE INTEREST OR PLEASURE IN DOING THINGS: NOT AT ALL
2. FEELING DOWN, DEPRESSED OR HOPELESS: NOT AT ALL
SUM OF ALL RESPONSES TO PHQ QUESTIONS 1-9: 0
10. IF YOU CHECKED OFF ANY PROBLEMS, HOW DIFFICULT HAVE THESE PROBLEMS MADE IT FOR YOU TO DO YOUR WORK, TAKE CARE OF THINGS AT HOME, OR GET ALONG WITH OTHER PEOPLE: NOT DIFFICULT AT ALL
5. POOR APPETITE OR OVEREATING: NOT AT ALL
9. THOUGHTS THAT YOU WOULD BE BETTER OFF DEAD, OR OF HURTING YOURSELF: NOT AT ALL
4. FEELING TIRED OR HAVING LITTLE ENERGY: NOT AT ALL
8. MOVING OR SPEAKING SO SLOWLY THAT OTHER PEOPLE COULD HAVE NOTICED. OR THE OPPOSITE, BEING SO FIGETY OR RESTLESS THAT YOU HAVE BEEN MOVING AROUND A LOT MORE THAN USUAL: NOT AT ALL
3. TROUBLE FALLING OR STAYING ASLEEP: NOT AT ALL
SUM OF ALL RESPONSES TO PHQ QUESTIONS 1-9: 0
SUM OF ALL RESPONSES TO PHQ QUESTIONS 1-9: 0

## 2025-04-10 NOTE — PROGRESS NOTES
Identified pt with two pt identifiers(name and )    Chief Complaint   Patient presents with    Hypertension     Patients BP keep spiking and has noticed some chest tightness recently     Check-Up     Elevated BP and concerns of weight gain        Health Maintenance Due   Topic    HIV screen     Hepatitis C screen     Hepatitis B vaccine (1 of 3 - 19+ 3-dose series)    Polio vaccine (3 of 3 - Adult catch-up series)    Pneumococcal 50+ years Vaccine (1 of 1 - PCV)    Shingles vaccine (2 of 2)    DTaP/Tdap/Td vaccine (2 - Td or Tdap)    COVID-19 Vaccine ( -  season)       Wt Readings from Last 3 Encounters:   04/10/25 107.2 kg (236 lb 6.4 oz)   24 107 kg (236 lb)   24 104.3 kg (230 lb)     Temp Readings from Last 3 Encounters:   04/10/25 97.8 °F (36.6 °C) (Temporal)   24 97.9 °F (36.6 °C) (Temporal)   24 98.4 °F (36.9 °C) (Oral)     BP Readings from Last 3 Encounters:   04/10/25 122/86   24 138/82   24 131/88     Pulse Readings from Last 3 Encounters:   04/10/25 66   24 71   24 86           Depression Screening:  :         4/10/2025     2:37 PM 2024     8:43 AM 2/15/2024     4:06 PM 3/27/2023    10:00 AM 2023     8:40 AM   PHQ-9 Questionaire   Little interest or pleasure in doing things 0 0 0 0 0   Feeling down, depressed, or hopeless 0 0 0 0 0   Trouble falling or staying asleep, or sleeping too much 0 0      Feeling tired or having little energy 0 0      Poor appetite or overeating 0 0      Feeling bad about yourself - or that you are a failure or have let yourself or your family down 0 0      Trouble concentrating on things, such as reading the newspaper or watching television 0 0      Moving or speaking so slowly that other people could have noticed. Or the opposite - being so fidgety or restless that you have been moving around a lot more than usual 0 0      Thoughts that you would be better off dead, or of hurting yourself in some way 0 0

## 2025-04-14 RX ORDER — METFORMIN HYDROCHLORIDE 500 MG/1
500 TABLET, EXTENDED RELEASE ORAL
Qty: 90 TABLET | Refills: 1 | Status: SHIPPED | OUTPATIENT
Start: 2025-04-14

## 2025-04-14 ASSESSMENT — ENCOUNTER SYMPTOMS
SHORTNESS OF BREATH: 0
SINUS PAIN: 0
VOMITING: 0
WHEEZING: 0
RHINORRHEA: 0
EYE REDNESS: 0
DIARRHEA: 0
BLOOD IN STOOL: 0
SINUS PRESSURE: 0
NAUSEA: 0
COUGH: 0
CHEST TIGHTNESS: 0
EYE DISCHARGE: 0
CONSTIPATION: 0
COLOR CHANGE: 0
SORE THROAT: 0
ABDOMINAL PAIN: 0
EYE ITCHING: 0
EYE PAIN: 0

## 2025-04-14 NOTE — PROGRESS NOTES
Carl Mendoza (:  1970) is a 54 y.o. male, Established patient, here for evaluation of the following chief complaint(s):  Hypertension (Patients BP keep spiking and has noticed some chest tightness recently ) and Check-Up (Elevated BP and concerns of weight gain)         Assessment & Plan  1. Weight management.  He has been experiencing weight gain despite regular gym attendance and dietary control. His endocrinologist had previously suggested addressing his sleep apnea before considering metformin treatment. His A1c levels have been within normal limits, although his fasting blood sugar levels have been slightly elevated. Discussed that metformin does help with weight loss. A prescription for Wegovy 4.5 mL will be sent to [facility], and prior authorization will be initiated. He has been advised to administer the injection weekly into the abdominal area, ensuring adequate hydration. Potential gastrointestinal side effects have been discussed, and he has been instructed to report any such occurrences promptly.    2. Anxiety.  He reports ongoing anxiety symptoms despite being out of work for 33 days. The anxiety may be related to his previous stressful job and current life changes. He has been advised to continue monitoring his symptoms and consider stress-reduction techniques such as mindfulness and meditation. If symptoms persist or worsen, further evaluation and potential treatment options will be considered.    3. Sleep apnea.  He has a history of sleep apnea and previously used an oral appliance, which wore out 7 years ago. A recent sleep study was conducted, and he is considering obtaining a new oral appliance. He has been advised to address his sleep apnea as it may be contributing to his weight gain and anxiety. He will follow up with his endocrinologist regarding the new oral appliance and potential use of metformin if needed.    4. Hypertension.  He expressed concerns about elevated blood

## 2025-04-16 ENCOUNTER — TELEPHONE (OUTPATIENT)
Age: 55
End: 2025-04-16

## 2025-04-18 ENCOUNTER — LAB (OUTPATIENT)
Age: 55
End: 2025-04-18

## 2025-04-18 DIAGNOSIS — E74.39 GLUCOSE INTOLERANCE: ICD-10-CM

## 2025-04-18 DIAGNOSIS — E03.9 ACQUIRED HYPOTHYROIDISM: ICD-10-CM

## 2025-04-18 DIAGNOSIS — I10 PRIMARY HYPERTENSION: ICD-10-CM

## 2025-04-19 LAB
ALBUMIN SERPL-MCNC: 4 G/DL (ref 3.5–5)
ALBUMIN/GLOB SERPL: 1.3 (ref 1.1–2.2)
ALP SERPL-CCNC: 100 U/L (ref 45–117)
ALT SERPL-CCNC: 38 U/L (ref 12–78)
ANION GAP SERPL CALC-SCNC: 3 MMOL/L (ref 2–12)
AST SERPL-CCNC: 27 U/L (ref 15–37)
BILIRUB SERPL-MCNC: 0.5 MG/DL (ref 0.2–1)
BUN SERPL-MCNC: 14 MG/DL (ref 6–20)
BUN/CREAT SERPL: 12 (ref 12–20)
CALCIUM SERPL-MCNC: 9.3 MG/DL (ref 8.5–10.1)
CHLORIDE SERPL-SCNC: 104 MMOL/L (ref 97–108)
CO2 SERPL-SCNC: 29 MMOL/L (ref 21–32)
CREAT SERPL-MCNC: 1.18 MG/DL (ref 0.7–1.3)
EST. AVERAGE GLUCOSE BLD GHB EST-MCNC: 94 MG/DL
GLOBULIN SER CALC-MCNC: 3 G/DL (ref 2–4)
GLUCOSE SERPL-MCNC: 115 MG/DL (ref 65–100)
HBA1C MFR BLD: 4.9 % (ref 4–5.6)
POTASSIUM SERPL-SCNC: 4.7 MMOL/L (ref 3.5–5.1)
PROT SERPL-MCNC: 7 G/DL (ref 6.4–8.2)
SODIUM SERPL-SCNC: 136 MMOL/L (ref 136–145)
T4 SERPL-MCNC: 10.5 UG/DL (ref 4.5–12.1)
TSH SERPL DL<=0.05 MIU/L-ACNC: 4.47 UIU/ML (ref 0.36–3.74)

## 2025-04-20 ENCOUNTER — RESULTS FOLLOW-UP (OUTPATIENT)
Age: 55
End: 2025-04-20

## 2025-04-30 DIAGNOSIS — M1A.0720 IDIOPATHIC CHRONIC GOUT OF LEFT FOOT WITHOUT TOPHUS: ICD-10-CM

## 2025-04-30 RX ORDER — COLCHICINE 0.6 MG/1
0.6 TABLET ORAL EVERY OTHER DAY
Qty: 45 TABLET | Refills: 0 | Status: SHIPPED | OUTPATIENT
Start: 2025-04-30

## 2025-07-15 DIAGNOSIS — M1A.0720 IDIOPATHIC CHRONIC GOUT OF LEFT FOOT WITHOUT TOPHUS: ICD-10-CM

## 2025-07-16 RX ORDER — COLCHICINE 0.6 MG/1
0.6 TABLET ORAL EVERY OTHER DAY
Qty: 45 TABLET | Refills: 3 | Status: SHIPPED | OUTPATIENT
Start: 2025-07-16

## 2025-08-14 ENCOUNTER — OFFICE VISIT (OUTPATIENT)
Age: 55
End: 2025-08-14

## 2025-08-14 VITALS
SYSTOLIC BLOOD PRESSURE: 133 MMHG | TEMPERATURE: 98.1 F | OXYGEN SATURATION: 95 % | HEART RATE: 60 BPM | WEIGHT: 240 LBS | DIASTOLIC BLOOD PRESSURE: 92 MMHG | BODY MASS INDEX: 31.81 KG/M2 | HEIGHT: 73 IN | RESPIRATION RATE: 15 BRPM

## 2025-08-14 DIAGNOSIS — R03.0 ELEVATED BLOOD PRESSURE READING: ICD-10-CM

## 2025-08-14 DIAGNOSIS — M10.9 ACUTE GOUT OF RIGHT FOOT, UNSPECIFIED CAUSE: Primary | ICD-10-CM

## 2025-08-14 RX ORDER — IBUPROFEN 800 MG/1
800 TABLET, FILM COATED ORAL 3 TIMES DAILY PRN
Qty: 90 TABLET | Refills: 0 | Status: SHIPPED | OUTPATIENT
Start: 2025-08-14

## 2025-08-14 RX ORDER — LOSARTAN POTASSIUM 25 MG/1
25 TABLET ORAL
COMMUNITY
End: 2025-08-14

## 2025-08-14 RX ORDER — PREDNISONE 20 MG/1
TABLET ORAL
Qty: 10 TABLET | Refills: 0 | Status: SHIPPED | OUTPATIENT
Start: 2025-08-14

## 2025-08-14 ASSESSMENT — ENCOUNTER SYMPTOMS
GASTROINTESTINAL NEGATIVE: 1
RESPIRATORY NEGATIVE: 1
EYES NEGATIVE: 1
ALLERGIC/IMMUNOLOGIC NEGATIVE: 1

## 2025-09-03 ENCOUNTER — LAB (OUTPATIENT)
Age: 55
End: 2025-09-03

## 2025-09-03 DIAGNOSIS — E78.1 HYPERTRIGLYCERIDEMIA: ICD-10-CM

## 2025-09-03 DIAGNOSIS — M10.9 ACUTE GOUT OF MULTIPLE SITES, UNSPECIFIED CAUSE: ICD-10-CM

## 2025-09-04 LAB
ALBUMIN SERPL-MCNC: 3.9 G/DL (ref 3.5–5.2)
ALBUMIN/GLOB SERPL: 1.4 (ref 1.1–2.2)
ALP SERPL-CCNC: 68 U/L (ref 40–129)
ALT SERPL-CCNC: 24 U/L (ref 10–50)
ANION GAP SERPL CALC-SCNC: 9 MMOL/L (ref 2–14)
AST SERPL-CCNC: 24 U/L (ref 10–50)
BILIRUB SERPL-MCNC: 0.5 MG/DL (ref 0–1.2)
BUN SERPL-MCNC: 11 MG/DL (ref 6–20)
BUN/CREAT SERPL: 10 (ref 12–20)
CALCIUM SERPL-MCNC: 9.4 MG/DL (ref 8.6–10)
CHLORIDE SERPL-SCNC: 101 MMOL/L (ref 98–107)
CHOLEST SERPL-MCNC: 155 MG/DL (ref 0–200)
CO2 SERPL-SCNC: 26 MMOL/L (ref 20–29)
CREAT SERPL-MCNC: 1.05 MG/DL (ref 0.7–1.2)
EST. AVERAGE GLUCOSE BLD GHB EST-MCNC: 109 MG/DL
GLOBULIN SER CALC-MCNC: 2.7 G/DL (ref 2–4)
GLUCOSE SERPL-MCNC: 106 MG/DL (ref 65–100)
HBA1C MFR BLD: 5.4 % (ref 4–5.6)
HDLC SERPL-MCNC: 37 MG/DL (ref 40–60)
HDLC SERPL: 4.2 (ref 0–5)
LDLC SERPL CALC-MCNC: 86 MG/DL (ref 0–100)
POTASSIUM SERPL-SCNC: 5.1 MMOL/L (ref 3.5–5.1)
PROT SERPL-MCNC: 6.6 G/DL (ref 6.4–8.3)
SODIUM SERPL-SCNC: 136 MMOL/L (ref 136–145)
TRIGL SERPL-MCNC: 159 MG/DL (ref 0–150)
URATE SERPL-MCNC: 6.9 MG/DL (ref 3.4–7.2)
VLDLC SERPL CALC-MCNC: 32 MG/DL

## (undated) DEVICE — BLADE ASSEMB CLP HAIR FINE --

## (undated) DEVICE — TOTAL JOINT - SMH: Brand: MEDLINE INDUSTRIES, INC.

## (undated) DEVICE — Device

## (undated) DEVICE — GARMENT,MEDLINE,DVT,INT,CALF,MED, GEN2: Brand: MEDLINE

## (undated) DEVICE — HANDLE LT SNAP ON ULT DURABLE LENS FOR TRUMPF ALC DISPOSABLE

## (undated) DEVICE — DRAPE,U/ SHT,SPLIT,PLAS,STERIL: Brand: MEDLINE

## (undated) DEVICE — SST TWIST DRILL, STANDARD, 2MM DIA. X 127MM: Brand: MICROAIRE®

## (undated) DEVICE — 4-PORT MANIFOLD: Brand: NEPTUNE 2

## (undated) DEVICE — TUBING SUCT 12FR MAL ALUM SHFT FN CAP VENT UNIV CONN W/ OBT

## (undated) DEVICE — SCRUB DRY SURG EZ SCRUB BRUSH PREOPERATIVE GRN

## (undated) DEVICE — HEWSON SUTURE RETRIEVER: Brand: HEWSON SUTURE RETRIEVER

## (undated) DEVICE — COVER,MAYO STAND,STERILE: Brand: MEDLINE

## (undated) DEVICE — DERMABOND SKIN ADH 0.7ML -- DERMABOND ADVANCED 12/BX

## (undated) DEVICE — GUIDE GLEN GUID GLEN BLUEPRINT PERFORM +

## (undated) DEVICE — SUTURE ETHBND EXCEL SZ 5 L30IN NONABSORBABLE GRN L40MM V-37 MB66G

## (undated) DEVICE — BLADE ELECTRODE: Brand: EDGE

## (undated) DEVICE — BLADE SAW W073XL276IN THK0031IN CUT THK0036IN REPL SAG

## (undated) DEVICE — SOLUTION SURG PREP 26 CC PURPREP

## (undated) DEVICE — SUTURE MCRYL SZ 3-0 L27IN ABSRB UD L19MM PS-2 3/8 CIR PRIM Y427H

## (undated) DEVICE — SOLUTION IRRIG 1000ML STRL H2O USP PLAS POUR BTL

## (undated) DEVICE — SUTURE VCRL SZ 2-0 L36IN ABSRB UD L36MM CT-1 1/2 CIR J945H

## (undated) DEVICE — GUIDE PIN 3X75MM SS -- SIMPLICITI

## (undated) DEVICE — SUTURE FIBERWIRE SZ 5 L38IN NONABSORBABLE BLU L48MM 1/2 AR7211

## (undated) DEVICE — DRESSING HYDROCOLLOID BORDER 35X10 IN ALUM PRIMASEAL

## (undated) DEVICE — SUTURE VCRL 0 L27IN ABSRB UD CT L40MM 1/2 CIR TAPERPOINT J280H

## (undated) DEVICE — NEEDLE SUTURE TAPR PT 5 0.5 CIR MAYO

## (undated) DEVICE — DRAPE,REIN 53X77,STERILE: Brand: MEDLINE